# Patient Record
Sex: FEMALE | Race: WHITE | NOT HISPANIC OR LATINO | Employment: STUDENT | URBAN - METROPOLITAN AREA
[De-identification: names, ages, dates, MRNs, and addresses within clinical notes are randomized per-mention and may not be internally consistent; named-entity substitution may affect disease eponyms.]

---

## 2017-07-17 ENCOUNTER — ALLSCRIPTS OFFICE VISIT (OUTPATIENT)
Dept: OTHER | Facility: OTHER | Age: 62
End: 2017-07-17

## 2017-07-17 DIAGNOSIS — Z12.39 ENCOUNTER FOR OTHER SCREENING FOR MALIGNANT NEOPLASM OF BREAST: ICD-10-CM

## 2017-07-27 ENCOUNTER — GENERIC CONVERSION - ENCOUNTER (OUTPATIENT)
Dept: OTHER | Facility: OTHER | Age: 62
End: 2017-07-27

## 2018-01-14 VITALS
WEIGHT: 226.38 LBS | HEIGHT: 64 IN | DIASTOLIC BLOOD PRESSURE: 84 MMHG | HEART RATE: 78 BPM | SYSTOLIC BLOOD PRESSURE: 138 MMHG | OXYGEN SATURATION: 98 % | BODY MASS INDEX: 38.65 KG/M2

## 2018-08-12 PROBLEM — Z01.419 ENCOUNTER FOR ANNUAL ROUTINE GYNECOLOGICAL EXAMINATION: Status: ACTIVE | Noted: 2018-08-12

## 2018-08-12 PROBLEM — Z12.31 SCREENING MAMMOGRAM, ENCOUNTER FOR: Status: ACTIVE | Noted: 2018-08-12

## 2018-08-13 ENCOUNTER — ANNUAL EXAM (OUTPATIENT)
Dept: GYNECOLOGY | Facility: CLINIC | Age: 63
End: 2018-08-13
Payer: COMMERCIAL

## 2018-08-13 VITALS
HEART RATE: 66 BPM | HEIGHT: 64 IN | RESPIRATION RATE: 16 BRPM | SYSTOLIC BLOOD PRESSURE: 128 MMHG | WEIGHT: 227 LBS | BODY MASS INDEX: 38.76 KG/M2 | DIASTOLIC BLOOD PRESSURE: 76 MMHG

## 2018-08-13 DIAGNOSIS — Z12.31 SCREENING MAMMOGRAM, ENCOUNTER FOR: ICD-10-CM

## 2018-08-13 DIAGNOSIS — Z01.419 ENCOUNTER FOR ANNUAL ROUTINE GYNECOLOGICAL EXAMINATION: Primary | ICD-10-CM

## 2018-08-13 DIAGNOSIS — R39.15 URGENCY OF URINATION: ICD-10-CM

## 2018-08-13 PROCEDURE — S0612 ANNUAL GYNECOLOGICAL EXAMINA: HCPCS | Performed by: OBSTETRICS & GYNECOLOGY

## 2018-08-13 RX ORDER — OMEPRAZOLE 20 MG/1
CAPSULE, DELAYED RELEASE ORAL
COMMUNITY

## 2018-08-13 RX ORDER — NAPROXEN 500 MG/1
TABLET ORAL
COMMUNITY
Start: 2018-07-29

## 2018-08-13 RX ORDER — MAG HYDROX/ALUMINUM HYD/SIMETH 400-400-40
SUSPENSION, ORAL (FINAL DOSE FORM) ORAL
COMMUNITY
End: 2019-08-26 | Stop reason: ALTCHOICE

## 2018-08-13 NOTE — PROGRESS NOTES
Assessment/Plan:  Normal gynecologic exam  Obesity- recommended portion control, she eats to stay awake while driving  Gained 2 5  CoTesting '17 and '20  Return the office in 1 year  3-D mammography annually  Self breast exams monthly  Calcium 1,000 mg/daily- she supplements  Exercise- very active but doesn't specifically exercise- work and walking dogs  Colonoscopy 11/12- 1 polyp- due 11/17- was changed to 7-10 yrs  Diagnoses and all orders for this visit:    Encounter for annual routine gynecological examination    Screening mammogram, encounter for  -     Mammo screening bilateral w 3d & cad; Future    Urgency of urination    Other orders  -     aspirin (ASPIRIN LOW DOSE) 81 MG tablet; Take by mouth  -     Garlic 8413 MG CAPS; Take by mouth  -     Misc Natural Products (GLUCOS-CHONDROIT-MSM COMPLEX PO); Take by mouth  -     naproxen (NAPROSYN) 500 mg tablet;   -     omeprazole (PriLOSEC) 20 mg delayed release capsule; Take by mouth  -     Cholecalciferol (VITAMIN D3) 5000 units CAPS; Take by mouth  -     CALCIUM CITRATE PO; Take by mouth  -     Omega-3 Fatty Acids (OMEGA 3 PO); Take by mouth              Subjective:        Patient ID: Evie Mg is a 59 y o  female  [facility] is here for an annual visit  She is w/o complaints  She has been unable to lose weight  She in fact gained 2 5 lb  Still uses snacks to keep herself awake while driving  The following portions of the patient's history were reviewed and updated as appropriate: She  has a past medical history of Lactose intolerance    Patient Active Problem List    Diagnosis Date Noted    Urgency of urination 08/13/2018    Encounter for annual routine gynecological examination 08/12/2018    Screening mammogram, encounter for 08/12/2018   PMH:  G5, P3; SAVD x3, VIP, Comp Ab  LTL '92  Appendectomy '80  GERD   Menopause '99 44  AUB - EMB 12/06  Cervical Polypectomy '12  Lactose Intolerant '13  She  has a past surgical history that includes Tubal ligation; Appendectomy; and Cervical polypectomy  Her family history includes Cancer in her father and mother; Hypertension in her father; No Known Problems in her daughter, son, and son  FH:  F- Bradycardia 68, MI 80's, d Pneumonia 80, Face Melanoma [de-identified]  M- Alzheimer's d 80, Skin Ca 90  MA - MI  MU- DM   She is an only child  She  reports that she has never smoked  She has never used smokeless tobacco  She reports that she does not drink alcohol or use drugs  SH:  for [employer] , not in a relationship  Does animal rescue  6 Dogs, cats, and 3 Parrots  Candice Glass graduated [employer] '16  Son had a daughter 4/13/17     Current Outpatient Prescriptions   Medication Sig Dispense Refill    aspirin (ASPIRIN LOW DOSE) 81 MG tablet Take by mouth      CALCIUM CITRATE PO Take by mouth      Cholecalciferol (VITAMIN D3) 5000 units CAPS Take by mouth      Garlic 8436 MG CAPS Take by mouth      Misc Natural Products (GLUCOS-CHONDROIT-MSM COMPLEX PO) Take by mouth      naproxen (NAPROSYN) 500 mg tablet       Omega-3 Fatty Acids (OMEGA 3 PO) Take by mouth      omeprazole (PriLOSEC) 20 mg delayed release capsule Take by mouth       No current facility-administered medications for this visit  No current outpatient prescriptions on file prior to visit  No current facility-administered medications on file prior to visit  She is allergic to lactose       Review of Systems   Constitutional: Negative for activity change, appetite change, fatigue and unexpected weight change  Eyes: Negative for visual disturbance  Respiratory: Negative for cough, chest tightness, shortness of breath and wheezing  Cardiovascular: Negative for chest pain, palpitations and leg swelling  Breast: Patient denies tenderness, nipple discharge, masses, or erythema  Gastrointestinal: Negative for abdominal distention, abdominal pain, blood in stool, constipation, diarrhea, nausea and vomiting     Endocrine: Negative for cold intolerance and heat intolerance  Genitourinary: Positive for urgency (Occasional urge incontinence  This usually occurs at home were she is free to drink  While working she limits liquid intake to avoid this problem  )  Negative for decreased urine volume, difficulty urinating, dyspareunia, dysuria, frequency, hematuria, menstrual problem, pelvic pain, vaginal bleeding, vaginal discharge and vaginal pain  Musculoskeletal: Negative for arthralgias  Skin: Negative for rash  Neurological: Negative for weakness, light-headedness, numbness and headaches  Hematological: Does not bruise/bleed easily  Psychiatric/Behavioral: Negative for agitation, behavioral problems and sleep disturbance  The patient is not nervous/anxious  Objective:    Vitals:    08/13/18 1336   BP: 128/76   BP Location: Right arm   Patient Position: Sitting   Cuff Size: Standard   Pulse: 66   Resp: 16   Weight: 103 kg (227 lb)   Height: 5' 4" (1 626 m)            Physical Exam   Constitutional: She is oriented to person, place, and time  She appears well-developed and well-nourished  HENT:   Head: Normocephalic and atraumatic  Eyes: Conjunctivae and EOM are normal  Pupils are equal, round, and reactive to light  Neck: Normal range of motion  Neck supple  No tracheal deviation present  No thyromegaly present  Cardiovascular: Normal rate, regular rhythm and normal heart sounds  No murmur heard  Pulmonary/Chest: Effort normal and breath sounds normal  No respiratory distress  She has no wheezes  Right breast exhibits no inverted nipple, no mass, no nipple discharge, no skin change and no tenderness  Left breast exhibits no inverted nipple, no mass, no nipple discharge, no skin change and no tenderness  Breasts are symmetrical    Abdominal: Soft  Bowel sounds are normal  She exhibits no distension and no mass  There is no tenderness     Genitourinary: Uterus normal  Rectal exam shows no external hemorrhoid  No breast swelling, tenderness, discharge or bleeding  There is no rash, tenderness or lesion on the right labia  There is no rash, tenderness or lesion on the left labia  Uterus is not deviated, not enlarged and not tender  Cervix exhibits no motion tenderness and no discharge  Right adnexum displays no mass, no tenderness and no fullness  Left adnexum displays no mass, no tenderness and no fullness  Genitourinary Comments: Stable mild cystocele  Musculoskeletal: Normal range of motion  Neurological: She is alert and oriented to person, place, and time  Skin: Skin is warm and dry  Psychiatric: She has a normal mood and affect  Her behavior is normal  Judgment and thought content normal    Nursing note and vitals reviewed

## 2019-08-25 NOTE — PROGRESS NOTES
Assessment/Plan:  Normal gynecologic exam  Obesity- recommended portion control, she eats to stay awake while driving  Gained 2 5 '18, 7 '19  CoTesting '20  Return the office in 1 year  3-D mammography annually  Self breast exams monthly  Calcium 1,000 mg/daily- she supplements   Exercise- very active but doesn't specifically exercise- work and walking dogs  Colonoscopy 11/12- 1 polyp- due 11/17- was changed to 7-10 yrs  Diagnoses and all orders for this visit:    Encounter for annual routine gynecological examination    Encounter for screening mammogram for breast cancer  -     Mammo screening bilateral w 3d & cad; Future    BMI 40 0-44 9, adult ([facility] [address] )              Subjective:        Patient ID: Cuauhtemoc Meza is a 62 y o  female  Sandra Skaggs is here for her yearly visit  She is without complaints  She denies any bleeding  She is not sexually active  The following portions of the patient's history were reviewed and updated as appropriate: She  has a past medical history of Lactose intolerance  Patient Active Problem List    Diagnosis Date Noted    Urgency of urination 08/13/2018    Encounter for annual routine gynecological examination 08/12/2018    Screening mammogram, encounter for 08/12/2018   PMH:  G5, P3; SAVD x3, VIP, Comp Ab  LTL '92  Appendectomy '80  GERD   Menopause '99 44  AUB - EMB 12/06  Cervical Polypectomy '12      Lactose Intolerant '13      Morbid obesity '19  She  has a past surgical history that includes Tubal ligation; Appendectomy; and Cervical polypectomy  Her family history includes Cancer in her father and mother; Hypertension in her father; No Known Problems in her daughter, son, and son  FH:  F- Bradycardia 68, MI 80's, d Pneumonia 80, Face Melanoma [de-identified]  M- Alzheimer's d 80, Skin Ca 90  MA - MI  MU- DM   She  reports that she has never smoked  She has never used smokeless tobacco  She reports that she does not drink alcohol or use drugs     SH:  for [email or web address]  , not in a relationship  Does animal rescue  3 Dogs, cats, and 3 Parrots  Kar graduated [employer] '16  She is engaged and will be  in 6/21  She and her fiance live in [facility] [place] [facility]  Son had a daughter 4/13/17  and lives in [place]  Current Outpatient Medications   Medication Sig Dispense Refill    aspirin (ASPIRIN LOW DOSE) 81 MG tablet Take by mouth      CALCIUM CITRATE PO Take by mouth      Garlic 7768 MG CAPS Take by mouth      Misc Natural Products (GLUCOS-CHONDROIT-MSM COMPLEX PO) Take by mouth      naproxen (NAPROSYN) 500 mg tablet       Omega-3 Fatty Acids (OMEGA 3 PO) Take by mouth      omeprazole (PriLOSEC) 20 mg delayed release capsule Take by mouth       No current facility-administered medications for this visit  Current Outpatient Medications on File Prior to Visit   Medication Sig    aspirin (ASPIRIN LOW DOSE) 81 MG tablet Take by mouth    CALCIUM CITRATE PO Take by mouth    Garlic 7346 MG CAPS Take by mouth    Misc Natural Products (GLUCOS-CHONDROIT-MSM COMPLEX PO) Take by mouth    naproxen (NAPROSYN) 500 mg tablet     Omega-3 Fatty Acids (OMEGA 3 PO) Take by mouth    omeprazole (PriLOSEC) 20 mg delayed release capsule Take by mouth    [DISCONTINUED] Cholecalciferol (VITAMIN D3) 5000 units CAPS Take by mouth     No current facility-administered medications on file prior to visit  She is allergic to lactose       Review of Systems   Constitutional: Negative for activity change, appetite change, fatigue and unexpected weight change  Eyes: Negative for visual disturbance  Respiratory: Negative for cough, chest tightness, shortness of breath and wheezing  Cardiovascular: Negative for chest pain, palpitations and leg swelling  Breast: Patient denies tenderness, nipple discharge, masses, or erythema  Gastrointestinal: Negative for abdominal distention, abdominal pain, blood in stool, constipation, diarrhea, nausea and vomiting     Endocrine: Negative for cold intolerance and heat intolerance  Genitourinary: Positive for frequency (One weekends when she ingests more liquids) and urgency (Rare urge incontinence which is because she delays going to the bathroom  )  Negative for decreased urine volume, difficulty urinating, dyspareunia, dysuria, hematuria, menstrual problem, pelvic pain, vaginal bleeding, vaginal discharge and vaginal pain  Musculoskeletal: Negative for arthralgias  Skin: Negative for rash  Neurological: Negative for weakness, light-headedness, numbness and headaches  Hematological: Does not bruise/bleed easily  Psychiatric/Behavioral: Negative for agitation, behavioral problems and sleep disturbance  The patient is not nervous/anxious  Objective:    Vitals:    08/26/19 1317   BP: 148/82   BP Location: Left arm   Patient Position: Sitting   Cuff Size: Extra-Large   Pulse: 98   Weight: 106 kg (234 lb)   Height: 5' 4" (1 626 m)            Physical Exam   Constitutional: She is oriented to person, place, and time  She appears well-developed and well-nourished  HENT:   Head: Normocephalic and atraumatic  Eyes: Pupils are equal, round, and reactive to light  Conjunctivae and EOM are normal    Neck: Normal range of motion  Neck supple  No tracheal deviation present  No thyromegaly present  Cardiovascular: Normal rate, regular rhythm and normal heart sounds  No murmur heard  Pulmonary/Chest: Effort normal and breath sounds normal  No respiratory distress  She has no wheezes  Right breast exhibits no inverted nipple, no mass, no nipple discharge, no skin change and no tenderness  Left breast exhibits no inverted nipple, no mass, no nipple discharge, no skin change and no tenderness  No breast tenderness, discharge or bleeding  Breasts are symmetrical    Abdominal: Soft  Bowel sounds are normal  She exhibits no distension and no mass  There is no tenderness     Genitourinary: Vagina normal and uterus normal  Rectal exam shows no external hemorrhoid  No breast tenderness, discharge or bleeding  There is no rash, tenderness or lesion on the right labia  There is no rash, tenderness or lesion on the left labia  Uterus is not deviated, not enlarged and not tender  Cervix exhibits no motion tenderness and no discharge  Right adnexum displays no mass, no tenderness and no fullness  Left adnexum displays no mass, no tenderness and no fullness  Genitourinary Comments: Urethral meatus within normal limits  Perineum within normal limits  Bladder well supported  Musculoskeletal: Normal range of motion  Neurological: She is alert and oriented to person, place, and time  Skin: Skin is warm and dry  Psychiatric: She has a normal mood and affect  Her behavior is normal  Judgment and thought content normal    Nursing note and vitals reviewed

## 2019-08-26 ENCOUNTER — ANNUAL EXAM (OUTPATIENT)
Dept: GYNECOLOGY | Facility: CLINIC | Age: 64
End: 2019-08-26
Payer: COMMERCIAL

## 2019-08-26 VITALS
HEIGHT: 64 IN | DIASTOLIC BLOOD PRESSURE: 82 MMHG | HEART RATE: 98 BPM | BODY MASS INDEX: 39.95 KG/M2 | WEIGHT: 234 LBS | SYSTOLIC BLOOD PRESSURE: 148 MMHG

## 2019-08-26 DIAGNOSIS — Z01.419 ENCOUNTER FOR ANNUAL ROUTINE GYNECOLOGICAL EXAMINATION: Primary | ICD-10-CM

## 2019-08-26 DIAGNOSIS — Z12.31 ENCOUNTER FOR SCREENING MAMMOGRAM FOR BREAST CANCER: ICD-10-CM

## 2019-08-26 PROCEDURE — S0612 ANNUAL GYNECOLOGICAL EXAMINA: HCPCS | Performed by: OBSTETRICS & GYNECOLOGY

## 2020-08-31 ENCOUNTER — ANNUAL EXAM (OUTPATIENT)
Dept: GYNECOLOGY | Facility: CLINIC | Age: 65
End: 2020-08-31
Payer: COMMERCIAL

## 2020-08-31 VITALS
DIASTOLIC BLOOD PRESSURE: 84 MMHG | HEIGHT: 61 IN | WEIGHT: 236.4 LBS | BODY MASS INDEX: 44.63 KG/M2 | SYSTOLIC BLOOD PRESSURE: 138 MMHG

## 2020-08-31 DIAGNOSIS — Z01.419 ENCOUNTER FOR ANNUAL ROUTINE GYNECOLOGICAL EXAMINATION: Primary | ICD-10-CM

## 2020-08-31 DIAGNOSIS — Z12.4 SCREENING FOR CERVICAL CANCER: ICD-10-CM

## 2020-08-31 DIAGNOSIS — Z12.31 ENCOUNTER FOR SCREENING MAMMOGRAM FOR BREAST CANCER: ICD-10-CM

## 2020-08-31 PROCEDURE — S0612 ANNUAL GYNECOLOGICAL EXAMINA: HCPCS | Performed by: OBSTETRICS & GYNECOLOGY

## 2020-08-31 PROCEDURE — G0145 SCR C/V CYTO,THINLAYER,RESCR: HCPCS | Performed by: OBSTETRICS & GYNECOLOGY

## 2020-08-31 PROCEDURE — 87624 HPV HI-RISK TYP POOLED RSLT: CPT | Performed by: OBSTETRICS & GYNECOLOGY

## 2020-08-31 NOTE — PROGRESS NOTES
Assessment/Plan:  Normal gynecologic exam  Obesity- recommended portion control, she eats to stay awake while driving  Gained 2 5 '18, 7 '19, 2 '20  Small cervical polyp  CoTesting '20- none further  Return the office in 1 year  3-D mammography annually  Self breast exams monthly  Calcium 1,000 mg/daily- she supplements   Exercise- very active but doesn't specifically exercise- work and walking dogs  Colonoscopy 11/12- 1 polyp- due 11/17- was changed to 7-10 yrs  Diagnoses and all orders for this visit:    Encounter for annual routine gynecological examination  -     Liquid-based pap, screening    Screening for cervical cancer  -     Liquid-based pap, screening    Encounter for screening mammogram for breast cancer  -     Mammo screening bilateral w 3d & cad; Future              Subjective:        Patient ID: Cornelia Paul is a 60 y o  female  [employer] is here for her yearly evaluation  She is without any complaints  She denies any vaginal bleeding  She is due from a mammogram   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      The following portions of the patient's history were reviewed and updated as appropriate: She  has a past medical history of Lactose intolerance  Patient Active Problem List    Diagnosis Date Noted    Urgency of urination 08/13/2018    Encounter for annual routine gynecological examination 08/12/2018    Screening mammogram, encounter for 08/12/2018   PMH:  G5, P3; SAVD x3, VIP, Comp Ab  LTL '92  Appendectomy '80  GERD   Menopause '99 44  AUB - EMB 12/06  Cervical Polypectomy '12      Lactose Intolerant '13      Morbid obesity '19  She  has a past surgical history that includes Tubal ligation; Appendectomy; and Cervical polypectomy  Her family history includes Cancer in her father and mother; Hypertension in her father; No Known Problems in her daughter, son, and son     FH:  F- Bradycardia 67, MI 80's, d Pneumonia 80, Face Melanoma [de-identified]  M- Alzheimer's d 80, Skin Ca 90  [facility]      MU- DM   She  reports that she has never smoked  She has never used smokeless tobacco  She reports that she does not drink alcohol or use drugs  SH:  for [employer] , not in a relationship  Does animal rescue  3 Dogs, cats, and 2 Parrots  [employer] graduated [employer] '16  She is engaged and will be  in 6/21  She and her fiance live in [place]  Son had a daughter 4/13/17  and lives in [place]  Current Outpatient Medications   Medication Sig Dispense Refill    aspirin (ASPIRIN LOW DOSE) 81 MG tablet Take by mouth      CALCIUM CITRATE PO Take by mouth      Garlic 6056 MG CAPS Take by mouth      Misc Natural Products (GLUCOS-CHONDROIT-MSM COMPLEX PO) Take by mouth      naproxen (NAPROSYN) 500 mg tablet       Omega-3 Fatty Acids (OMEGA 3 PO) Take by mouth      omeprazole (PriLOSEC) 20 mg delayed release capsule Take by mouth       No current facility-administered medications for this visit  Current Outpatient Medications on File Prior to Visit   Medication Sig    aspirin (ASPIRIN LOW DOSE) 81 MG tablet Take by mouth    CALCIUM CITRATE PO Take by mouth    Garlic 7600 MG CAPS Take by mouth    Misc Natural Products (GLUCOS-CHONDROIT-MSM COMPLEX PO) Take by mouth    naproxen (NAPROSYN) 500 mg tablet     Omega-3 Fatty Acids (OMEGA 3 PO) Take by mouth    omeprazole (PriLOSEC) 20 mg delayed release capsule Take by mouth     No current facility-administered medications on file prior to visit  She is allergic to lactose       Review of Systems   Constitutional: Positive for unexpected weight change (Another 2 lb this year)  Negative for activity change, appetite change, chills, fatigue and fever  HENT: Negative for congestion, rhinorrhea, sinus pressure, sore throat and trouble swallowing  Eyes: Negative for discharge, redness, itching and visual disturbance     Respiratory: Negative for cough, chest tightness, shortness of breath and wheezing  Cardiovascular: Negative for chest pain, palpitations and leg swelling  Gastrointestinal: Negative for abdominal distention, abdominal pain, blood in stool, constipation, diarrhea, nausea and vomiting  Genitourinary: Positive for urgency (If voiding is delayed  Rare urge incontinence)  Negative for decreased urine volume, difficulty urinating, dyspareunia, dysuria, frequency, hematuria, menstrual problem, pelvic pain, vaginal bleeding, vaginal discharge and vaginal pain  Musculoskeletal: Negative for arthralgias  Skin: Negative for rash  Neurological: Negative for weakness, light-headedness, numbness and headaches  Hematological: Does not bruise/bleed easily  Psychiatric/Behavioral: Negative for agitation, behavioral problems and sleep disturbance  The patient is not nervous/anxious  Objective: There were no vitals filed for this visit  Physical Exam  Vitals signs and nursing note reviewed  Constitutional:       General: She is not in acute distress  Appearance: She is well-developed  She is obese  She is not ill-appearing  HENT:      Head: Normocephalic and atraumatic  Eyes:      Conjunctiva/sclera: Conjunctivae normal       Pupils: Pupils are equal, round, and reactive to light  Neck:      Musculoskeletal: Normal range of motion and neck supple  Thyroid: No thyromegaly  Trachea: No tracheal deviation  Cardiovascular:      Rate and Rhythm: Normal rate and regular rhythm  Heart sounds: Normal heart sounds  No murmur  Pulmonary:      Effort: Pulmonary effort is normal  No respiratory distress  Breath sounds: Normal breath sounds  No wheezing  Chest:      Breasts: Breasts are symmetrical          Right: No inverted nipple, mass, nipple discharge, skin change or tenderness  Left: No inverted nipple, mass, nipple discharge, skin change or tenderness  Abdominal:      General: Bowel sounds are normal  There is no distension  Palpations: Abdomen is soft  There is no mass  Tenderness: There is no abdominal tenderness  Genitourinary:     General: Normal vulva  Labia:         Right: No rash, tenderness or lesion  Left: No rash, tenderness or lesion  Vagina: Normal  No vaginal discharge  Cervix: No cervical motion tenderness or discharge  Uterus: Not deviated, not enlarged and not tender  Adnexa:         Right: No mass, tenderness or fullness  Left: No mass, tenderness or fullness  Rectum: No external hemorrhoid  Comments: Urethral meatus within normal limits  Perineum within normal limits  Bladder well supported  Small cervical polyp  Musculoskeletal: Normal range of motion  Skin:     General: Skin is warm and dry  Neurological:      Mental Status: She is alert and oriented to person, place, and time  Psychiatric:         Behavior: Behavior normal          Thought Content:  Thought content normal          Judgment: Judgment normal

## 2020-09-02 LAB
HPV HR 12 DNA CVX QL NAA+PROBE: NEGATIVE
HPV16 DNA CVX QL NAA+PROBE: NEGATIVE
HPV18 DNA CVX QL NAA+PROBE: NEGATIVE

## 2020-09-09 LAB
LAB AP GYN PRIMARY INTERPRETATION: NORMAL
Lab: NORMAL

## 2020-12-14 ENCOUNTER — HOSPITAL ENCOUNTER (OUTPATIENT)
Dept: RADIOLOGY | Facility: MEDICAL CENTER | Age: 65
Discharge: HOME | End: 2020-12-14
Payer: COMMERCIAL

## 2020-12-14 VITALS — BODY MASS INDEX: 44.56 KG/M2 | WEIGHT: 236 LBS | HEIGHT: 61 IN

## 2020-12-14 DIAGNOSIS — Z12.31 ENCOUNTER FOR SCREENING MAMMOGRAM FOR BREAST CANCER: ICD-10-CM

## 2020-12-14 PROCEDURE — 77067 SCR MAMMO BI INCL CAD: CPT

## 2020-12-14 PROCEDURE — 77063 BREAST TOMOSYNTHESIS BI: CPT

## 2021-02-09 DIAGNOSIS — Z23 ENCOUNTER FOR IMMUNIZATION: ICD-10-CM

## 2021-03-08 ENCOUNTER — IMMUNIZATIONS (OUTPATIENT)
Dept: FAMILY MEDICINE CLINIC | Facility: HOSPITAL | Age: 66
End: 2021-03-08

## 2021-03-08 DIAGNOSIS — Z23 ENCOUNTER FOR IMMUNIZATION: Primary | ICD-10-CM

## 2021-03-08 PROCEDURE — 91300 SARS-COV-2 / COVID-19 MRNA VACCINE (PFIZER-BIONTECH) 30 MCG: CPT

## 2021-03-08 PROCEDURE — 0001A SARS-COV-2 / COVID-19 MRNA VACCINE (PFIZER-BIONTECH) 30 MCG: CPT

## 2021-04-01 ENCOUNTER — IMMUNIZATIONS (OUTPATIENT)
Dept: FAMILY MEDICINE CLINIC | Facility: HOSPITAL | Age: 66
End: 2021-04-01

## 2021-04-01 DIAGNOSIS — Z23 ENCOUNTER FOR IMMUNIZATION: Primary | ICD-10-CM

## 2021-04-01 PROCEDURE — 0002A SARS-COV-2 / COVID-19 MRNA VACCINE (PFIZER-BIONTECH) 30 MCG: CPT

## 2021-04-01 PROCEDURE — 91300 SARS-COV-2 / COVID-19 MRNA VACCINE (PFIZER-BIONTECH) 30 MCG: CPT

## 2021-06-16 ENCOUNTER — TELEPHONE (OUTPATIENT)
Dept: GASTROENTEROLOGY | Facility: CLINIC | Age: 66
End: 2021-06-16

## 2021-06-16 NOTE — TELEPHONE ENCOUNTER
Patient called stating she received a call from us that she would be responsible for $300 for her colonoscopy scheduled June 21  She called her insurance and it is because she had a colonoscopy done 9 years ago and they will not cover until after 10 years   States she has to wait another year and cancelled her procedure for June 21 st

## 2021-08-19 ENCOUNTER — TELEPHONE (OUTPATIENT)
Dept: GASTROENTEROLOGY | Facility: CLINIC | Age: 66
End: 2021-08-19

## 2021-08-19 NOTE — TELEPHONE ENCOUNTER
Recall call went out via [place] in 2020 with no return calls from pt to schedule  Pt is due for a recall colon with Dr Viji Conner  Called and spoke to pt whom informed that she had it done elsewhere

## 2021-09-11 PROBLEM — N84.1 CERVICAL POLYP: Status: ACTIVE | Noted: 2021-09-11

## 2021-09-11 NOTE — PROGRESS NOTES
Assessment/Plan:  Normal gynecologic exam  Small cervical polyp 20  Small rectocele '21  Obesity- recommended portion control, she eats to stay awake while driving  Gained 4 0 '15, 7 '19, 2 '20  Lost 6 '21  BMD- now  CoTesting '20- none further  Return the office in 1 year  3-D mammography annually  Self breast exams monthly  Calcium 1,000 mg/daily- she supplements   Exercise- very active but doesn't specifically exercise- work and walking dogs  Colonoscopy 11/12- 1 polyp- due 11/17- was changed to 7-10 yrs  Diagnoses and all orders for this visit:    Encounter for annual routine gynecological examination    Screening mammogram, encounter for  -     Mammo screening bilateral w 3d & cad; Future    Cervical polyp    Encounter for osteoporosis screening in asymptomatic postmenopausal patient  -     DXA bone density spine hip and pelvis; Future    Urgency of urination    Urge incontinence    Rectocele    Other orders  -     hydrochlorothiazide (HYDRODIURIL) 25 mg tablet  -     metoprolol succinate (TOPROL-XL) 50 mg 24 hr tablet              Subjective:        Patient ID: Abdi Yip is a 73 y o  female  Dotty is here for a yearly evaluation  She is without complaints  She attempted to have a colonoscopy earlier in the year was told she had a $300 co-pay  Procedure was canceled and she try to reschedule but the office never called her back  She has an appointment today to discuss scheduling a colonoscopy  She was diagnosed with hypertension in October  An echocardiogram was performed and then a CT scan which revealed and an Aneurysm  The following portions of the patient's history were reviewed and updated as appropriate: She  has a past medical history of Lactose intolerance    Patient Active Problem List    Diagnosis Date Noted    Cervical polyp 09/11/2021    Urgency of urination 08/13/2018    Encounter for annual routine gynecological examination 08/12/2018    Screening mammogram, encounter for 08/12/2018   PMH:  G5, P3; SAVD x3, VIP, Comp Ab  LTL '92  Appendectomy '80  GERD   Menopause '99 44  AUB - EMB 12/06  Cervical Polypectomy '12      Lactose Intolerant '13      Morbid obesity '19  She  has a past surgical history that includes Tubal ligation; Appendectomy; and Cervical polypectomy  Her family history includes Cancer in her father and mother; Cancer (age of onset: [de-identified]) in her maternal aunt; Hypertension in her father; No Known Problems in her daughter, son, and son  FH:  F- Bradycardia 67, MI 80's, d Pneumonia 80, Face Melanoma [de-identified]  M- Alzheimer's d 80, Skin Ca 90  MA - MI      MU- DM   She  reports that she has never smoked  She has never used smokeless tobacco  She reports that she does not drink alcohol and does not use drugs  SH:  for [employer] , not in a relationship  Does animal rescue  3 Dogs, cats, and 2 Parrots  [employer] graduated [employer] '16  Wedding/Engagement was broken off by ejnna  Son had a daughter 4/13/17  and lives in [facility]  Current Outpatient Medications   Medication Sig Dispense Refill    aspirin (ASPIRIN LOW DOSE) 81 MG tablet Take by mouth      CALCIUM CITRATE PO Take by mouth      Garlic 4317 MG CAPS Take by mouth      hydrochlorothiazide (HYDRODIURIL) 25 mg tablet       metoprolol succinate (TOPROL-XL) 50 mg 24 hr tablet       Misc Natural Products (GLUCOS-CHONDROIT-MSM COMPLEX PO) Take by mouth      naproxen (NAPROSYN) 500 mg tablet       Omega-3 Fatty Acids (OMEGA 3 PO) Take by mouth      omeprazole (PriLOSEC) 20 mg delayed release capsule Take by mouth       No current facility-administered medications for this visit       Current Outpatient Medications on File Prior to Visit   Medication Sig    aspirin (ASPIRIN LOW DOSE) 81 MG tablet Take by mouth    CALCIUM CITRATE PO Take by mouth    Garlic 2249 MG CAPS Take by mouth    hydrochlorothiazide (HYDRODIURIL) 25 mg tablet     metoprolol succinate (TOPROL-XL) 50 mg 24 hr tablet     Misc Natural Products (GLUCOS-CHONDROIT-MSM COMPLEX PO) Take by mouth    naproxen (NAPROSYN) 500 mg tablet     Omega-3 Fatty Acids (OMEGA 3 PO) Take by mouth    omeprazole (PriLOSEC) 20 mg delayed release capsule Take by mouth     No current facility-administered medications on file prior to visit  She is allergic to lactose - food allergy       Review of Systems   Constitutional: Negative for activity change, appetite change, fatigue and unexpected weight change  Eyes: Negative for visual disturbance  Respiratory: Negative for cough, chest tightness, shortness of breath and wheezing  Cardiovascular: Negative for chest pain, palpitations and leg swelling  Breast: Patient denies tenderness, nipple discharge, masses, or erythema  Gastrointestinal: Negative for abdominal distention, abdominal pain, blood in stool, constipation, diarrhea, nausea and vomiting  Endocrine: Negative for cold intolerance and heat intolerance  Genitourinary: Positive for urgency  Negative for decreased urine volume, difficulty urinating, dysuria, frequency, hematuria, menstrual problem, pelvic pain, vaginal bleeding, vaginal discharge and vaginal pain  She is not sexually active  She does note urgency with urge incontinence if she waits too long  She also has rectal urgency at times  Musculoskeletal: Negative for arthralgias  Skin: Negative for rash  Neurological: Negative for weakness, light-headedness, numbness and headaches  Hematological: Does not bruise/bleed easily  Psychiatric/Behavioral: Negative for agitation, behavioral problems and sleep disturbance  The patient is not nervous/anxious  Objective:    Vitals:    09/13/21 1115   BP: 130/70   Weight: 104 kg (230 lb)   Height: 5' 1" (1 549 m)            Physical Exam  Vitals and nursing note reviewed  Constitutional:       General: She is not in acute distress  Appearance: She is well-developed     HENT:      Head: Normocephalic and atraumatic  Eyes:      General: No scleral icterus  Right eye: No discharge  Left eye: No discharge  Extraocular Movements: Extraocular movements intact  Conjunctiva/sclera: Conjunctivae normal    Neck:      Thyroid: No thyromegaly  Trachea: No tracheal deviation  Cardiovascular:      Rate and Rhythm: Normal rate and regular rhythm  Heart sounds: Normal heart sounds  No murmur heard  Pulmonary:      Effort: Pulmonary effort is normal  No respiratory distress  Breath sounds: Normal breath sounds  No wheezing  Chest:      Breasts: Breasts are symmetrical          Right: No inverted nipple, mass, nipple discharge, skin change or tenderness  Left: No inverted nipple, mass, nipple discharge, skin change or tenderness  Abdominal:      General: Bowel sounds are normal  There is no distension  Palpations: Abdomen is soft  There is no mass  Tenderness: There is no abdominal tenderness  There is no right CVA tenderness, left CVA tenderness, guarding or rebound  Genitourinary:     General: Normal vulva  Labia:         Right: No rash, tenderness or lesion  Left: No rash, tenderness or lesion  Vagina: Normal       Cervix: No cervical motion tenderness or discharge  Uterus: Not deviated, not enlarged and not tender  Adnexa:         Right: No mass, tenderness or fullness  Left: No mass, tenderness or fullness  Rectum: No external hemorrhoid  Comments: Urethral meatus within normal limits  Perineum within normal limits  Bladder well supported  There is a mild rectocele present and physiologic vaginal atrophy  There is a stable cervical polyp  Musculoskeletal:         General: No tenderness  Normal range of motion  Cervical back: Normal range of motion and neck supple  Lymphadenopathy:      Cervical: No cervical adenopathy  Skin:     General: Skin is warm and dry     Neurological:      Mental Status: She is alert and oriented to person, place, and time  Psychiatric:         Mood and Affect: Mood normal          Behavior: Behavior normal          Thought Content:  Thought content normal          Judgment: Judgment normal

## 2021-09-13 ENCOUNTER — PREP FOR PROCEDURE (OUTPATIENT)
Dept: SURGERY | Facility: CLINIC | Age: 66
End: 2021-09-13

## 2021-09-13 ENCOUNTER — ANNUAL EXAM (OUTPATIENT)
Dept: OBGYN CLINIC | Facility: CLINIC | Age: 66
End: 2021-09-13
Payer: COMMERCIAL

## 2021-09-13 ENCOUNTER — CONSULT (OUTPATIENT)
Dept: SURGERY | Facility: CLINIC | Age: 66
End: 2021-09-13
Payer: COMMERCIAL

## 2021-09-13 VITALS
BODY MASS INDEX: 43.43 KG/M2 | WEIGHT: 230 LBS | DIASTOLIC BLOOD PRESSURE: 70 MMHG | HEIGHT: 61 IN | SYSTOLIC BLOOD PRESSURE: 130 MMHG

## 2021-09-13 VITALS — BODY MASS INDEX: 39.27 KG/M2 | WEIGHT: 230 LBS | HEIGHT: 64 IN

## 2021-09-13 DIAGNOSIS — Z86.010 HISTORY OF COLON POLYPS: Primary | ICD-10-CM

## 2021-09-13 DIAGNOSIS — N81.6 RECTOCELE: ICD-10-CM

## 2021-09-13 DIAGNOSIS — R39.15 URGENCY OF URINATION: ICD-10-CM

## 2021-09-13 DIAGNOSIS — Z78.0 ENCOUNTER FOR OSTEOPOROSIS SCREENING IN ASYMPTOMATIC POSTMENOPAUSAL PATIENT: ICD-10-CM

## 2021-09-13 DIAGNOSIS — Z13.820 ENCOUNTER FOR OSTEOPOROSIS SCREENING IN ASYMPTOMATIC POSTMENOPAUSAL PATIENT: ICD-10-CM

## 2021-09-13 DIAGNOSIS — N84.1 CERVICAL POLYP: ICD-10-CM

## 2021-09-13 DIAGNOSIS — N39.41 URGE INCONTINENCE: ICD-10-CM

## 2021-09-13 DIAGNOSIS — Z01.419 ENCOUNTER FOR ANNUAL ROUTINE GYNECOLOGICAL EXAMINATION: Primary | ICD-10-CM

## 2021-09-13 DIAGNOSIS — Z12.11 ENCOUNTER FOR SCREENING COLONOSCOPY: Primary | ICD-10-CM

## 2021-09-13 DIAGNOSIS — Z12.31 SCREENING MAMMOGRAM, ENCOUNTER FOR: ICD-10-CM

## 2021-09-13 PROCEDURE — 99202 OFFICE O/P NEW SF 15 MIN: CPT | Performed by: SURGERY

## 2021-09-13 PROCEDURE — G0101 CA SCREEN;PELVIC/BREAST EXAM: HCPCS | Performed by: OBSTETRICS & GYNECOLOGY

## 2021-09-13 RX ORDER — HYDROCHLOROTHIAZIDE 25 MG/1
TABLET ORAL
COMMUNITY
Start: 2021-08-13

## 2021-09-13 RX ORDER — METOPROLOL SUCCINATE 50 MG/1
TABLET, EXTENDED RELEASE ORAL
COMMUNITY
Start: 2021-08-31

## 2021-09-13 NOTE — PROGRESS NOTES
Assessment/Plan:  Patient has a history of colon polyps  She desires updated colonoscopy  No family history for colon cancer  No symptoms of change in her GI status  Colonoscopy recommended  Risks and benefits described in detail  All questions answered  Patient's chart and imaging studies reviewed  Diagnoses and all orders for this visit:    Encounter for screening colonoscopy        Subjective:      Patient ID: Sylvia Dixon is a 73 y o  female  Patient presents for pre colonoscopy consult  Denies problems  States last colonoscopy was 9 years ago and she had polyps  The following portions of the patient's history were reviewed and updated as appropriate:     She  has a past medical history of Colon polyp (2012), Hypertension (2021), and Lactose intolerance  She  has a past surgical history that includes Tubal ligation; Appendectomy; Cervical polypectomy; and Colonoscopy (2012)  Her family history includes Cancer in her father and mother; Cancer (age of onset: [de-identified]) in her maternal aunt; Hypertension in her father; No Known Problems in her daughter, son, and son  She  reports that she has never smoked  She has never used smokeless tobacco  She reports that she does not drink alcohol and does not use drugs  Current Outpatient Medications   Medication Sig Dispense Refill    aspirin (ASPIRIN LOW DOSE) 81 MG tablet Take by mouth      CALCIUM CITRATE PO Take by mouth      Garlic 5067 MG CAPS Take by mouth      hydrochlorothiazide (HYDRODIURIL) 25 mg tablet       metoprolol succinate (TOPROL-XL) 50 mg 24 hr tablet       Misc Natural Products (GLUCOS-CHONDROIT-MSM COMPLEX PO) Take by mouth      naproxen (NAPROSYN) 500 mg tablet       Omega-3 Fatty Acids (OMEGA 3 PO) Take by mouth      omeprazole (PriLOSEC) 20 mg delayed release capsule Take by mouth       No current facility-administered medications for this visit  She is allergic to lactose - food allergy       Review of Systems Constitutional: Negative  Negative for activity change  HENT: Negative  Eyes: Negative  Respiratory: Negative  Cardiovascular: Negative  Gastrointestinal: Negative  Endocrine: Negative  Genitourinary: Negative  Musculoskeletal: Negative  Skin: Negative  Allergic/Immunologic: Positive for food allergies  Neurological: Negative  Psychiatric/Behavioral: Negative for agitation, behavioral problems and confusion  The patient is not nervous/anxious  All other systems reviewed and are negative  Objective:      Ht 5' 4" (1 626 m)   Wt 104 kg (230 lb)   LMP  (LMP Unknown)   BMI 39 48 kg/m²          Physical Exam  Constitutional:       Appearance: Normal appearance  She is well-developed  HENT:      Head: Normocephalic and atraumatic  Nose: Nose normal    Eyes:      Extraocular Movements: Extraocular movements intact  Conjunctiva/sclera: Conjunctivae normal    Cardiovascular:      Rate and Rhythm: Normal rate and regular rhythm  Heart sounds: Normal heart sounds  Pulmonary:      Effort: Pulmonary effort is normal       Breath sounds: Normal breath sounds  Abdominal:      General: Abdomen is flat  Musculoskeletal:         General: Normal range of motion  Cervical back: Normal range of motion  Skin:     General: Skin is warm and dry  Neurological:      Mental Status: She is alert and oriented to person, place, and time     Psychiatric:         Mood and Affect: Mood normal

## 2021-10-19 ENCOUNTER — TELEPHONE (OUTPATIENT)
Dept: OTHER | Facility: OTHER | Age: 66
End: 2021-10-19

## 2021-10-22 ENCOUNTER — TELEPHONE (OUTPATIENT)
Dept: GASTROENTEROLOGY | Facility: HOSPITAL | Age: 66
End: 2021-10-22

## 2021-10-24 ENCOUNTER — ANESTHESIA (OUTPATIENT)
Dept: ANESTHESIOLOGY | Facility: HOSPITAL | Age: 66
End: 2021-10-24

## 2021-10-24 ENCOUNTER — ANESTHESIA EVENT (OUTPATIENT)
Dept: ANESTHESIOLOGY | Facility: HOSPITAL | Age: 66
End: 2021-10-24

## 2021-10-25 ENCOUNTER — ANESTHESIA EVENT (OUTPATIENT)
Dept: GASTROENTEROLOGY | Facility: HOSPITAL | Age: 66
End: 2021-10-25

## 2021-10-25 ENCOUNTER — ANESTHESIA (OUTPATIENT)
Dept: GASTROENTEROLOGY | Facility: HOSPITAL | Age: 66
End: 2021-10-25

## 2021-10-25 ENCOUNTER — HOSPITAL ENCOUNTER (OUTPATIENT)
Dept: GASTROENTEROLOGY | Facility: HOSPITAL | Age: 66
Setting detail: OUTPATIENT SURGERY
Discharge: HOME | End: 2021-10-25
Attending: SURGERY
Payer: COMMERCIAL

## 2021-10-25 VITALS
HEIGHT: 64 IN | TEMPERATURE: 97.1 F | OXYGEN SATURATION: 100 % | RESPIRATION RATE: 18 BRPM | BODY MASS INDEX: 39.27 KG/M2 | HEART RATE: 65 BPM | WEIGHT: 230 LBS | SYSTOLIC BLOOD PRESSURE: 120 MMHG | DIASTOLIC BLOOD PRESSURE: 49 MMHG

## 2021-10-25 DIAGNOSIS — Z86.010 HISTORY OF COLON POLYPS: ICD-10-CM

## 2021-10-25 PROBLEM — I10 HYPERTENSION: Status: ACTIVE | Noted: 2021-01-01

## 2021-10-25 PROCEDURE — G0121 COLON CA SCRN NOT HI RSK IND: HCPCS | Performed by: SURGERY

## 2021-10-25 RX ORDER — PROPOFOL 10 MG/ML
INJECTION, EMULSION INTRAVENOUS AS NEEDED
Status: DISCONTINUED | OUTPATIENT
Start: 2021-10-25 | End: 2021-10-25

## 2021-10-25 RX ORDER — SODIUM CHLORIDE 9 MG/ML
INJECTION, SOLUTION INTRAVENOUS CONTINUOUS PRN
Status: DISCONTINUED | OUTPATIENT
Start: 2021-10-25 | End: 2021-10-25

## 2021-10-25 RX ADMIN — SODIUM CHLORIDE: 9 INJECTION, SOLUTION INTRAVENOUS at 07:45

## 2021-10-25 RX ADMIN — PROPOFOL 100 MG: 10 INJECTION, EMULSION INTRAVENOUS at 08:02

## 2021-10-25 RX ADMIN — PROPOFOL 30 MG: 10 INJECTION, EMULSION INTRAVENOUS at 08:10

## 2021-10-25 RX ADMIN — PROPOFOL 30 MG: 10 INJECTION, EMULSION INTRAVENOUS at 08:15

## 2021-10-25 RX ADMIN — PROPOFOL 20 MG: 10 INJECTION, EMULSION INTRAVENOUS at 08:20

## 2021-10-25 RX ADMIN — PROPOFOL 20 MG: 10 INJECTION, EMULSION INTRAVENOUS at 08:06

## 2021-12-13 ENCOUNTER — HOSPITAL ENCOUNTER (OUTPATIENT)
Dept: RADIOLOGY | Facility: MEDICAL CENTER | Age: 66
Discharge: HOME | End: 2021-12-13
Payer: COMMERCIAL

## 2021-12-13 DIAGNOSIS — I71.2 THORACIC AORTIC ANEURYSM, WITHOUT RUPTURE (HCC): ICD-10-CM

## 2021-12-13 PROCEDURE — 71250 CT THORAX DX C-: CPT

## 2022-01-15 ENCOUNTER — IMMUNIZATIONS (OUTPATIENT)
Dept: FAMILY MEDICINE CLINIC | Facility: HOSPITAL | Age: 67
End: 2022-01-15

## 2022-01-15 DIAGNOSIS — Z23 ENCOUNTER FOR IMMUNIZATION: Primary | ICD-10-CM

## 2022-01-15 PROCEDURE — 91300 COVID-19 PFIZER VACC 0.3 ML: CPT

## 2022-01-15 PROCEDURE — 0001A COVID-19 PFIZER VACC 0.3 ML: CPT

## 2022-01-31 ENCOUNTER — HOSPITAL ENCOUNTER (OUTPATIENT)
Dept: RADIOLOGY | Facility: MEDICAL CENTER | Age: 67
Discharge: HOME | End: 2022-01-31
Payer: COMMERCIAL

## 2022-01-31 VITALS — WEIGHT: 230 LBS | BODY MASS INDEX: 39.27 KG/M2 | HEIGHT: 64 IN

## 2022-01-31 DIAGNOSIS — Z13.820 ENCOUNTER FOR OSTEOPOROSIS SCREENING IN ASYMPTOMATIC POSTMENOPAUSAL PATIENT: ICD-10-CM

## 2022-01-31 DIAGNOSIS — Z12.31 SCREENING MAMMOGRAM, ENCOUNTER FOR: ICD-10-CM

## 2022-01-31 DIAGNOSIS — Z78.0 ENCOUNTER FOR OSTEOPOROSIS SCREENING IN ASYMPTOMATIC POSTMENOPAUSAL PATIENT: ICD-10-CM

## 2022-01-31 PROCEDURE — 77067 SCR MAMMO BI INCL CAD: CPT

## 2022-01-31 PROCEDURE — 77080 DXA BONE DENSITY AXIAL: CPT

## 2022-01-31 PROCEDURE — 77063 BREAST TOMOSYNTHESIS BI: CPT

## 2022-09-18 PROBLEM — N81.6 RECTOCELE: Status: ACTIVE | Noted: 2022-09-18

## 2022-09-18 PROBLEM — E66.9 OBESITY (BMI 35.0-39.9 WITHOUT COMORBIDITY): Status: ACTIVE | Noted: 2022-09-18

## 2022-09-18 PROBLEM — N39.41 URGE INCONTINENCE: Status: ACTIVE | Noted: 2022-09-18

## 2022-09-18 PROBLEM — Z12.31 SCREENING MAMMOGRAM, ENCOUNTER FOR: Status: ACTIVE | Noted: 2022-09-18

## 2022-09-19 ENCOUNTER — ANNUAL EXAM (OUTPATIENT)
Dept: OBGYN CLINIC | Facility: CLINIC | Age: 67
End: 2022-09-19
Payer: COMMERCIAL

## 2022-09-19 VITALS
SYSTOLIC BLOOD PRESSURE: 120 MMHG | DIASTOLIC BLOOD PRESSURE: 70 MMHG | WEIGHT: 248 LBS | HEIGHT: 63 IN | BODY MASS INDEX: 43.94 KG/M2

## 2022-09-19 DIAGNOSIS — R39.15 URGENCY OF URINATION: ICD-10-CM

## 2022-09-19 DIAGNOSIS — N39.41 URGE INCONTINENCE: ICD-10-CM

## 2022-09-19 DIAGNOSIS — N84.1 CERVICAL POLYP: Primary | ICD-10-CM

## 2022-09-19 DIAGNOSIS — Z12.31 SCREENING MAMMOGRAM, ENCOUNTER FOR: ICD-10-CM

## 2022-09-19 DIAGNOSIS — N81.6 RECTOCELE: ICD-10-CM

## 2022-09-19 PROCEDURE — 99214 OFFICE O/P EST MOD 30 MIN: CPT | Performed by: OBSTETRICS & GYNECOLOGY

## 2022-09-19 RX ORDER — FUROSEMIDE 20 MG/1
TABLET ORAL
COMMUNITY
Start: 2022-07-18

## 2022-09-19 RX ORDER — TRIAMCINOLONE ACETONIDE 1 MG/G
CREAM TOPICAL
COMMUNITY
Start: 2022-07-20

## 2022-09-19 RX ORDER — METOPROLOL SUCCINATE 25 MG/1
TABLET, EXTENDED RELEASE ORAL
COMMUNITY
Start: 2022-07-20

## 2022-09-19 NOTE — PROGRESS NOTES
Assessment/Plan:  Small cervical polyp '22 - stable  Small rectocele '21  - stable  Obesity- recommended portion control, she eats to stay awake while driving  Gained 23 since  '18   BMD- NL 1/22 , repeat 1/28 [facility] 0 4/12 %]  Urgency with urge incontinence - stable  CoTesting '20- none further  Return the office in 1 year  3-D mammography annually  Self breast exams monthly  Calcium 1,000 mg/daily- she supplements   Exercise- very active but doesn't specifically exercise- work and walking dogs  Colonoscopy 11/12- 1 polyp, repeated 10/21 - 10 yrs now       Diagnoses and all orders for this visit:    Cervical polyp    Rectocele    Urgency of urination    Urge incontinence    Screening mammogram, encounter for  -     Mammo screening bilateral w 3d & cad; Future    BMI 40 0-44 9, adult ([facility] [address] )    Other orders  -     metoprolol succinate (TOPROL-XL) 25 mg 24 hr tablet  -     triamcinolone (KENALOG) 0 1 % cream  -     furosemide (LASIX) 20 mg tablet              Subjective:        Patient ID: Ulysses Potash is a 78 y o  female  Zaki Pace returns for a follow-up visit  A cervical polyp was present last year  It was small and observed  She denies any vaginal bleeding  She is not sexually active  To screen for osteoporosis she underwent a bone density study  It was normal   She continues to walk her animals and consumes calcium and takes supplements  Unfortunately she continues to gain weight-18 this past year  She eats at work to keep herself awake  Portion control has been explained in the past but not embraced  She denies any pelvic pressure or bulging  She cannot see the rectocele  She is compliant with mammography and self-breast awareness  The urgency and urge incontinence continue  They are worse in the morning following the diuretic  She is aware this would improve if she lost weight      She is compliant with mammography and performs self-breast awareness to screen for breast cancer      The following portions of the patient's history were reviewed and updated as appropriate: She  has a past medical history of Colon polyp (2012), Hypertension (2021), and Lactose intolerance  Patient Active Problem List    Diagnosis Date Noted    Rectocele 09/18/2022    Urge incontinence 09/18/2022    Screening mammogram, encounter for 09/18/2022    Obesity (BMI 35 0-39 9 without comorbidity) 09/18/2022    Cervical polyp 09/11/2021    Hypertension 2021    Urgency of urination 08/13/2018    Encounter for annual routine gynecological examination 08/12/2018    Encounter for screening colonoscopy 08/12/2018    Colon polyp 2012   PMH:  G5, P3; SAVD x3, VIP, Comp Ab  LTL '92  Appendectomy '80  GERD   Menopause '99 44  AUB - EMB 12/06  Cervical Polypectomy '12      Lactose Intolerant '13      Morbid obesity '19      HTN '21      Eczema 6/22  She  has a past surgical history that includes Tubal ligation; Appendectomy; Cervical polypectomy; and Colonoscopy (2012)  Her family history includes Cancer in her father and mother; Cancer (age of onset: [de-identified]) in her maternal aunt; Hypertension in her father; No Known Problems in her daughter, maternal grandfather, maternal grandmother, paternal grandfather, paternal grandmother, son, and son  FH:  F- Bradycardia 67, MI 80's, d Pneumonia 80, Face Melanoma [de-identified]  M- Alzheimer's d 80, Skin Ca 80  MA - MI      MU- DM   She  reports that she has never smoked  She has never used smokeless tobacco  She reports that she does not drink alcohol and does not use drugs  SH:    for [employer] , not in a relationship  Does animal rescue  3 Dogs, cats, and 2 Parrots  Kar graduated [employer] '16  Wedding/Engagement was broken off by jenna  Son had a daughter 4/13/17  and lives in [place]    Current Outpatient Medications   Medication Sig Dispense Refill    aspirin 81 MG tablet Take by mouth      CALCIUM CITRATE PO Take by mouth      furosemide (LASIX) 20 mg tablet       Garlic 1000 MG CAPS Take by mouth      metoprolol succinate (TOPROL-XL) 25 mg 24 hr tablet       metoprolol succinate (TOPROL-XL) 50 mg 24 hr tablet       Misc Natural Products (GLUCOS-CHONDROIT-MSM COMPLEX PO) Take by mouth      naproxen (NAPROSYN) 500 mg tablet       Omega-3 Fatty Acids (OMEGA 3 PO) Take by mouth      omeprazole (PriLOSEC) 20 mg delayed release capsule Take by mouth      triamcinolone (KENALOG) 0 1 % cream       hydrochlorothiazide (HYDRODIURIL) 25 mg tablet  (Patient not taking: No sig reported)       No current facility-administered medications for this visit  Current Outpatient Medications on File Prior to Visit   Medication Sig    aspirin 81 MG tablet Take by mouth    CALCIUM CITRATE PO Take by mouth    furosemide (LASIX) 20 mg tablet     Garlic 8345 MG CAPS Take by mouth    metoprolol succinate (TOPROL-XL) 25 mg 24 hr tablet     metoprolol succinate (TOPROL-XL) 50 mg 24 hr tablet     Misc Natural Products (GLUCOS-CHONDROIT-MSM COMPLEX PO) Take by mouth    naproxen (NAPROSYN) 500 mg tablet     Omega-3 Fatty Acids (OMEGA 3 PO) Take by mouth    omeprazole (PriLOSEC) 20 mg delayed release capsule Take by mouth    triamcinolone (KENALOG) 0 1 % cream     hydrochlorothiazide (HYDRODIURIL) 25 mg tablet  (Patient not taking: No sig reported)     No current facility-administered medications on file prior to visit  She is allergic to lactose - food allergy       Review of Systems   Constitutional: Negative for activity change, appetite change, fatigue and unexpected weight change  Eyes: Negative for visual disturbance  Respiratory: Negative for cough, chest tightness, shortness of breath and wheezing  Cardiovascular: Negative for chest pain, palpitations and leg swelling  Breast: Patient denies tenderness, nipple discharge, masses, or erythema     Gastrointestinal: Negative for abdominal distention, abdominal pain, blood in stool, constipation, diarrhea, nausea and vomiting  Endocrine: Negative for cold intolerance and heat intolerance  Genitourinary: Positive for urgency  Negative for decreased urine volume, difficulty urinating, dysuria, frequency, hematuria, menstrual problem, pelvic pain, vaginal bleeding, vaginal discharge and vaginal pain  Stable urgency and urge incontinence  Musculoskeletal: Negative for arthralgias  Skin: Negative for rash  Neurological: Negative for weakness, light-headedness, numbness and headaches  Hematological: Does not bruise/bleed easily  Psychiatric/Behavioral: Negative for agitation, behavioral problems and sleep disturbance  The patient is not nervous/anxious  Objective:    Vitals:    09/19/22 1144   BP: 120/70   BP Location: Right arm   Patient Position: Sitting   Cuff Size: Standard   Weight: 112 kg (248 lb)   Height: 5' 3" (1 6 m)            Physical Exam  Vitals and nursing note reviewed  Constitutional:       General: She is not in acute distress  Appearance: She is well-developed  HENT:      Head: Normocephalic and atraumatic  Eyes:      General: No scleral icterus  Right eye: No discharge  Left eye: No discharge  Extraocular Movements: Extraocular movements intact  Conjunctiva/sclera: Conjunctivae normal    Neck:      Thyroid: No thyromegaly  Trachea: No tracheal deviation  Cardiovascular:      Rate and Rhythm: Normal rate and regular rhythm  Heart sounds: Normal heart sounds  No murmur heard  Pulmonary:      Effort: Pulmonary effort is normal  No respiratory distress  Breath sounds: Normal breath sounds  No wheezing  Chest:   Breasts: Breasts are symmetrical       Right: No inverted nipple, mass, nipple discharge, skin change or tenderness  Left: No inverted nipple, mass, nipple discharge, skin change or tenderness  Abdominal:      General: Bowel sounds are normal  There is no distension  Palpations: Abdomen is soft  There is no mass  Tenderness: There is no abdominal tenderness  There is no guarding or rebound  Genitourinary:     General: Normal vulva  Labia:         Right: No rash, tenderness or lesion  Left: No rash, tenderness or lesion  Vagina: Normal       Cervix: No cervical motion tenderness or discharge  Uterus: Not deviated, not enlarged and not tender  Adnexa:         Right: No mass, tenderness or fullness  Left: No mass, tenderness or fullness  Rectum: No external hemorrhoid  Comments: Urethral meatus within normal limits  Perineum within normal limits  Bladder well supported  There is a stable rectocele  A small cervical polyp is present within the ectocervical os  Musculoskeletal:         General: No tenderness  Normal range of motion  Cervical back: Normal range of motion and neck supple  Lymphadenopathy:      Cervical: No cervical adenopathy  Skin:     General: Skin is warm and dry  Neurological:      Mental Status: She is alert and oriented to person, place, and time  Psychiatric:         Mood and Affect: Mood normal          Behavior: Behavior normal          Thought Content:  Thought content normal          Judgment: Judgment normal

## 2022-12-19 ENCOUNTER — HOSPITAL ENCOUNTER (OUTPATIENT)
Dept: RADIOLOGY | Facility: MEDICAL CENTER | Age: 67
Discharge: HOME | End: 2022-12-19

## 2022-12-19 DIAGNOSIS — R91.1 COIN LESION: ICD-10-CM

## 2023-02-14 ENCOUNTER — HOSPITAL ENCOUNTER (OUTPATIENT)
Dept: RADIOLOGY | Facility: MEDICAL CENTER | Age: 68
Discharge: HOME | End: 2023-02-14

## 2023-02-14 VITALS — WEIGHT: 248 LBS | BODY MASS INDEX: 43.94 KG/M2 | HEIGHT: 63 IN

## 2023-02-14 DIAGNOSIS — Z12.31 SCREENING MAMMOGRAM, ENCOUNTER FOR: ICD-10-CM

## 2023-06-05 ENCOUNTER — HOSPITAL ENCOUNTER (OUTPATIENT)
Dept: RADIOLOGY | Facility: MEDICAL CENTER | Age: 68
Discharge: HOME | End: 2023-06-05
Payer: COMMERCIAL

## 2023-06-05 DIAGNOSIS — R91.1 SOLITARY PULMONARY NODULE: ICD-10-CM

## 2023-06-05 PROCEDURE — 71250 CT THORAX DX C-: CPT

## 2023-06-05 PROCEDURE — G1004 CDSM NDSC: HCPCS

## 2023-07-18 NOTE — PROGRESS NOTES
Pulmonary Consultation   Troy Canales 80 y.o. female MRN: [id number]  7/18/2023      Reason for Consultation:  Pulmonary nodules    Requested by:  No ref. provider found     Primary care provider: Lynette Barkley MD    Assessment:  Abnormal CT scan  Given that patient is without symptoms and nonspecific findings on CAT scan. No further work-up or imaging is needed. She is of low risk and Lori Naegeli guidelines for lung nodules recommend repeat CT chest in 18 months. She is due for repeat CT chest for aneurysm monitoring in June of next year, and that is sufficient enough for nodule monitoring. Plan:    Diagnoses and all orders for this visit:    Abnormal CT scan of lung        History of Present Illness   HPI:  Troy Canales is a 80 y.o. female with past medical history of hypertension who presents due to abnormal CT. patient has been getting CT chest surveillance  due to aneurysm of ascending aorta. On imaging she was noted to have mucous plugging along with scattered nodules. She does not have any respiratory complaints. She states she was given antibiotics for bronchitis due to mucus impaction seen in December 2021, which she states she feels she then developed bronchitis thereafter. Otherwise denies SOB, cough, night sweats, unexplained weight loss. She denies any occupational risk factors for malignancy. She denies any family history of malignancy. Review of Systems   Constitutional: Negative for activity change, chills, diaphoresis, fatigue and fever. HENT: Negative for congestion, postnasal drip, rhinorrhea, sinus pressure, sneezing, sore throat and trouble swallowing. Eyes: Negative. Respiratory: Negative for cough, chest tightness and shortness of breath. Cardiovascular: Negative for chest pain, palpitations and leg swelling. Gastrointestinal: Negative for constipation, diarrhea, nausea and vomiting. Endocrine: Negative. Genitourinary: Negative.     Musculoskeletal: Negative for back pain, joint swelling and neck pain. Skin: Negative. Allergic/Immunologic: Negative. Neurological: Negative for dizziness, syncope, weakness, light-headedness and headaches. Hematological: Negative. Psychiatric/Behavioral: Negative. All other systems reviewed and are negative. Historical Information   Past Medical History:   Diagnosis Date   • Colon polyp 2012   • Hypertension 2021   • Lactose intolerance      Past Surgical History:   Procedure Laterality Date   • APPENDECTOMY     • CERVICAL POLYPECTOMY     • COLONOSCOPY  2012   • TUBAL LIGATION       Family History   Problem Relation Age of Onset   • Cancer Mother         Skin   • Hypertension Father    • Cancer Father         skin   • No Known Problems Daughter    • No Known Problems Son    • No Known Problems Son    • No Known Problems Maternal Grandmother    • No Known Problems Maternal Grandfather    • No Known Problems Paternal Grandmother    • No Known Problems Paternal Grandfather    • Cancer Maternal Aunt 80       Occupational History: dispatch  bus service     Social History:   Alcohol: none   Smoking: lifelong nonsmoker  Illicit drugs: none   Home heating system: oil  Pets:cats, dogs, bird cockatoo (nov 2005) and parrot (jan 2004)  Hobbies: donte  Exposures: birds (amio, nitrofurantoin, cyclophos, asbestos, beryllium, glass cutting, mining, sandblasting, farming, woodwork, Birds, down bedding, hot tubs)    Preventative:   Influenza vaccine: Sept 2022  Pneumonia vaccine: never     Meds/Allergies     Current Outpatient Medications:   •  aspirin 81 MG tablet, Take by mouth, Disp: , Rfl:   •  CALCIUM CITRATE PO, Take by mouth, Disp: , Rfl:   •  furosemide (LASIX) 20 mg tablet, , Disp: , Rfl:   •  Garlic 7107 MG CAPS, Take by mouth, Disp: , Rfl:   •  hydrochlorothiazide (HYDRODIURIL) 25 mg tablet, , Disp: , Rfl:   •  metoprolol succinate (TOPROL-XL) 25 mg 24 hr tablet, , Disp: , Rfl:   •  metoprolol succinate (TOPROL-XL) 50 mg 24 hr tablet, , Disp: , Rfl:   •  Misc Natural Products (GLUCOS-CHONDROIT-MSM COMPLEX PO), Take by mouth, Disp: , Rfl:   •  naproxen (NAPROSYN) 500 mg tablet, , Disp: , Rfl:   •  Omega-3 Fatty Acids (OMEGA 3 PO), Take by mouth, Disp: , Rfl:   •  omeprazole (PriLOSEC) 20 mg delayed release capsule, Take by mouth, Disp: , Rfl:   •  triamcinolone (KENALOG) 0.1 % cream, , Disp: , Rfl:   Allergies   Allergen Reactions   • Lactose - Food Allergy        Vitals: Blood pressure 128/72, pulse 60, temperature (!) 97.4 °F (36.3 °C), temperature source Tympanic, weight 112 kg (248 lb), SpO2 98 %. , Body mass index is 43.93 kg/m². Physical Exam  Physical Exam  Vitals and nursing note reviewed. Constitutional:       Appearance: Normal appearance. She is obese. HENT:      Head: Normocephalic and atraumatic. Right Ear: External ear normal.      Left Ear: External ear normal.      Nose: Nose normal.      Mouth/Throat:      Mouth: Mucous membranes are moist.      Pharynx: Oropharynx is clear. Eyes:      Conjunctiva/sclera: Conjunctivae normal.   Cardiovascular:      Rate and Rhythm: Normal rate and regular rhythm. Pulses: Normal pulses. Heart sounds: Normal heart sounds. Pulmonary:      Effort: Pulmonary effort is normal.      Breath sounds: Normal breath sounds. Abdominal:      General: Abdomen is flat. Bowel sounds are normal.      Palpations: Abdomen is soft. Musculoskeletal:         General: No swelling or tenderness. Cervical back: Neck supple. No muscular tenderness. Skin:     General: Skin is warm and dry. Capillary Refill: Capillary refill takes less than 2 seconds. Neurological:      Mental Status: She is alert and oriented to person, place, and time. Mental status is at baseline. Psychiatric:         Mood and Affect: Mood normal.         Behavior: Behavior normal.         Thought Content: Thought content normal.         Judgment: Judgment normal.         Labs:  I have personally reviewed pertinent lab results. No results found for: "WBC", "HGB", "HCT", "MCV", "PLT"  No results found for: "GLUCOSE", "CALCIUM", "NA", "K", "CO2", "CL", "BUN", "CREATININE"  No results found for: "IGE"  No results found for: "ALT", "AST", "GGT", "ALKPHOS", "BILITOT"    Imaging and other studies: I have personally reviewed pertinent reports. and I have personally reviewed pertinent films in PACS  CT chest 6/5/23:Again seen are few areas of peribronchial nodular density, compatible with chronic mucoid impaction. Nodules as follows (series 4): 5 mm nodule in the inferior right upper lobe on image #62, unchanged. 6 mm left lung base nodule image #70, unchanged. 4 mm left lung base nodule image #76, unchanged. A cluster of tree-in-bud densities in the left lower lobe on image #65, unchanged. CT chest 12/19/22:  There is mild groundglass opacity in the medial left lower lobe and lingula, stable since prior CT examination. Pulmonary nodules are seen on series 3 and compared with 12/13/2021 as described below. Again seen are branching nodular opacities in the left lower lobe (images 65 image 67) measuring 2.4 cm in greatest dimension (series 601 image 105). The largest individual nodular opacity measures tup to 0.8 cm (image 66), unchanged in size since   12/13/2021. Again seen is another cluster of branching nodular opacities more inferiorly on images 69-77, the largest nodular opacity measuring 0.7 cm on image 70, unchanged in size since 12/13/2021. A new 2 mm pulmonary nodule in the posterior right upper lobe with possible internal calcification (image 43). A linear 2 mm right lower lobe pulmonary nodule (image 65). A 2 mm right lower lobe pulmonary nodule (image 94). A 2 mm groundglass right middle lobe pulmonary nodule (image 67. A 2 mm right middle lobe pulmonary nodule (image 58). A 3 mm stable thai fissural nodule in the right middle lobe (image 54).  A possible stable 3 mm linear right upper lobe pulmonary nodule (image 25). Pulmonary function testing: n/a    EKG, Pathology, and Other Studies: I have personally reviewed pertinent reports. Echo 2/23/21: EF 55-60% with grade ! Diastolic dysfunction     [place] MD Adeline  Pulmonary/Critical Care Fellow PGY-V  [facility]      Disclaimer: Portions of the record may have been created with voice recognition software. Occasional wrong word or "sound a like" substitutions may have occurred due to the inherent limitations of voice recognition software. Careful consideration should be taken to recognize, using context, where substitutions have occurred.

## 2023-07-19 ENCOUNTER — CONSULT (OUTPATIENT)
Dept: PULMONOLOGY | Facility: CLINIC | Age: 68
End: 2023-07-19
Payer: COMMERCIAL

## 2023-07-19 VITALS
DIASTOLIC BLOOD PRESSURE: 72 MMHG | HEART RATE: 60 BPM | TEMPERATURE: 97.4 F | SYSTOLIC BLOOD PRESSURE: 128 MMHG | BODY MASS INDEX: 43.93 KG/M2 | WEIGHT: 248 LBS | OXYGEN SATURATION: 98 %

## 2023-07-19 DIAGNOSIS — R91.8 ABNORMAL CT SCAN OF LUNG: Primary | ICD-10-CM

## 2023-07-19 PROCEDURE — 99204 OFFICE O/P NEW MOD 45 MIN: CPT | Performed by: INTERNAL MEDICINE

## 2023-10-09 ENCOUNTER — ANNUAL EXAM (OUTPATIENT)
Dept: OBGYN CLINIC | Facility: CLINIC | Age: 68
End: 2023-10-09
Payer: COMMERCIAL

## 2023-10-09 VITALS
SYSTOLIC BLOOD PRESSURE: 124 MMHG | HEIGHT: 63 IN | DIASTOLIC BLOOD PRESSURE: 84 MMHG | WEIGHT: 256.2 LBS | BODY MASS INDEX: 45.39 KG/M2

## 2023-10-09 DIAGNOSIS — N81.6 RECTOCELE: ICD-10-CM

## 2023-10-09 DIAGNOSIS — Z12.31 SCREENING MAMMOGRAM, ENCOUNTER FOR: ICD-10-CM

## 2023-10-09 DIAGNOSIS — Z01.419 ENCOUNTER FOR ANNUAL ROUTINE GYNECOLOGICAL EXAMINATION: Primary | ICD-10-CM

## 2023-10-09 DIAGNOSIS — N39.41 URGE INCONTINENCE: ICD-10-CM

## 2023-10-09 DIAGNOSIS — R39.15 URGENCY OF URINATION: ICD-10-CM

## 2023-10-09 DIAGNOSIS — N84.1 CERVICAL POLYP: ICD-10-CM

## 2023-10-09 PROCEDURE — G0101 CA SCREEN;PELVIC/BREAST EXAM: HCPCS | Performed by: OBSTETRICS & GYNECOLOGY

## 2023-10-09 RX ORDER — METHYLPREDNISOLONE 4 MG/1
TABLET ORAL
COMMUNITY
Start: 2023-10-04

## 2023-10-09 RX ORDER — ALBUTEROL SULFATE 90 UG/1
AEROSOL, METERED RESPIRATORY (INHALATION)
COMMUNITY
Start: 2023-10-04

## 2023-10-09 NOTE — PROGRESS NOTES
Assessment/Plan: AbNGE  Small cervical polyp '20 - stable  Small rectocele '21  - stable  Obesity- recommended portion control, she eats to stay awake while driving. Gained 31 since  '18   BMD- NL 1/22 , repeat 1/28 [facility] 0.4/12 %]  Urgency with urge incontinence - stable  CoTesting '20- none further  Return the office in 1 year  3-D mammography annually  Self breast exams monthly  Calcium 1,000 mg/daily- she supplements   Exercise- very active but doesn't specifically exercise- work and walking dogs. Colonoscopy 11/12- 1 polyp, repeated 10/21 - 10 yrs now          Diagnoses and all orders for this visit:    Encounter for annual routine gynecological examination    Screening mammogram, encounter for  -     Mammo screening bilateral w 3d & cad; Future    Rectocele    Cervical polyp    Urgency of urination    Urge incontinence    Other orders  -     albuterol (PROVENTIL HFA,VENTOLIN HFA) 90 mcg/act inhaler  -     methylPREDNISolone 4 MG tablet therapy pack              Subjective:        Patient ID: Anastasiya Ralph is a 80 y.o. female. Dotty presents today for a yearly evaluation. She has no complaints. She denies any vaginal bleeding. She has persistent urgency and urge incontinence which is stable. She notes urinary frequency in the morning following the use of her diuretic. She is being followed by pulmonary for a solitary lung nodule. For the past year or so she has had a cough due to excess mucus. Her evaluation has been negative. She was recently given an inhaler. The following portions of the patient's history were reviewed and updated as appropriate: She  has a past medical history of Colon polyp (2012), Hypertension (2021), Lactose intolerance, and Varicella (1995).   Patient Active Problem List    Diagnosis Date Noted   • Abnormal CT scan of lung 07/19/2023   • Rectocele 09/18/2022   • Urge incontinence 09/18/2022   • Screening mammogram, encounter for 09/18/2022   • Obesity (BMI 35.0-39.9 without comorbidity) 09/18/2022   • Cervical polyp 09/11/2021   • Hypertension 2021   • Urgency of urination 08/13/2018   • Encounter for annual routine gynecological examination 08/12/2018   • Encounter for screening colonoscopy 08/12/2018   • Colon polyp 2012   PMH:  G5, P3; SAVD x3, VIP, Comp Ab  LTL '92  Appendectomy '80  GERD   Menopause '99 44  AUB - EMB 12/06  Cervical Polypectomy '12      Lactose Intolerant '13      Morbid obesity '19      HTN '21      Eczema 6/22  She  has a past surgical history that includes Tubal ligation; Appendectomy; Cervical polypectomy; and Colonoscopy (2012). Her family history includes Cancer in her father and mother; Cancer (age of onset: 80) in her maternal aunt; Hypertension in her father; No Known Problems in her daughter, maternal grandfather, maternal grandmother, paternal grandfather, paternal grandmother, son, and son. FH:  F- Bradycardia 67, MI 80's, d Pneumonia 80, Face Melanoma 80's  M- Alzheimer's d 80, Skin Ca 80  MA - MI      MU- DM   She  reports that she has never smoked. She has never used smokeless tobacco. She reports that she does not drink alcohol and does not use drugs. SH:   for [employer] , not in a relationship. 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 [employer] graduated [employer] '16. Wedding/Engagement was broken off by jenna. Aman ,28, [me] has 2  daughters and lives in [facility]).   Current Outpatient Medications   Medication Sig Dispense Refill   • albuterol (PROVENTIL HFA,VENTOLIN HFA) 90 mcg/act inhaler      • aspirin 81 MG tablet Take by mouth     • CALCIUM CITRATE PO Take by mouth     • furosemide (LASIX) 20 mg tablet      • Garlic 7809 MG CAPS Take by mouth     • methylPREDNISolone 4 MG tablet therapy pack      • metoprolol succinate (TOPROL-XL) 25 mg 24 hr tablet Take 75 mg by mouth 2 (two) times a day     • Misc Natural Products (GLUCOS-CHONDROIT-MSM COMPLEX PO) Take by mouth     • naproxen (NAPROSYN) 500 mg tablet      • Omega-3 Fatty Acids (OMEGA 3 PO) Take by mouth     • omeprazole (PriLOSEC) 20 mg delayed release capsule Take by mouth     • triamcinolone (KENALOG) 0.1 % cream        No current facility-administered medications for this visit. Current Outpatient Medications on File Prior to Visit   Medication Sig   • albuterol (PROVENTIL HFA,VENTOLIN HFA) 90 mcg/act inhaler    • aspirin 81 MG tablet Take by mouth   • CALCIUM CITRATE PO Take by mouth   • furosemide (LASIX) 20 mg tablet    • Garlic 2837 MG CAPS Take by mouth   • methylPREDNISolone 4 MG tablet therapy pack    • metoprolol succinate (TOPROL-XL) 25 mg 24 hr tablet Take 75 mg by mouth 2 (two) times a day   • Misc Natural Products (GLUCOS-CHONDROIT-MSM COMPLEX PO) Take by mouth   • naproxen (NAPROSYN) 500 mg tablet    • Omega-3 Fatty Acids (OMEGA 3 PO) Take by mouth   • omeprazole (PriLOSEC) 20 mg delayed release capsule Take by mouth   • triamcinolone (KENALOG) 0.1 % cream      No current facility-administered medications on file prior to visit. She is allergic to lactose - food allergy. .    Review of Systems   Constitutional: Negative for activity change, appetite change, fatigue and unexpected weight change. HENT: Positive for postnasal drip. Eyes: Negative for visual disturbance. Respiratory: Negative for cough, chest tightness, shortness of breath and wheezing. Cardiovascular: Negative for chest pain, palpitations and leg swelling. Breast: Patient denies tenderness, nipple discharge, masses, or erythema. Gastrointestinal: Negative for abdominal distention, abdominal pain, blood in stool, constipation, diarrhea, nausea and vomiting. Endocrine: Negative for cold intolerance and heat intolerance. Genitourinary: Positive for urgency. Negative for decreased urine volume, difficulty urinating, dysuria, frequency, hematuria, menstrual problem, pelvic pain, vaginal bleeding, vaginal discharge and vaginal pain.         Stable urgency and urge incontinence. Musculoskeletal: Negative for arthralgias. Skin: Negative for rash. Neurological: Negative for weakness, light-headedness, numbness and headaches. Hematological: Does not bruise/bleed easily. Psychiatric/Behavioral: Negative for agitation, behavioral problems and sleep disturbance. The patient is not nervous/anxious. Objective:    Vitals:    10/09/23 1522   BP: 124/84   BP Location: Left arm   Patient Position: Sitting   Cuff Size: Large   Weight: 116 kg (256 lb 3.2 oz)   Height: 5' 3.25" (1.607 m)            Physical Exam  Vitals and nursing note reviewed. Constitutional:       General: She is not in acute distress. Appearance: She is well-developed. HENT:      Head: Normocephalic and atraumatic. Eyes:      General: No scleral icterus. Right eye: No discharge. Left eye: No discharge. Extraocular Movements: Extraocular movements intact. Conjunctiva/sclera: Conjunctivae normal.   Neck:      Thyroid: No thyromegaly. Trachea: No tracheal deviation. Cardiovascular:      Rate and Rhythm: Normal rate and regular rhythm. Heart sounds: Normal heart sounds. No murmur heard. Pulmonary:      Effort: Pulmonary effort is normal. No respiratory distress. Breath sounds: Normal breath sounds. No wheezing. Chest:   Breasts:     Breasts are symmetrical.      Right: No inverted nipple, mass, nipple discharge, skin change or tenderness. Left: No inverted nipple, mass, nipple discharge, skin change or tenderness. Abdominal:      General: Bowel sounds are normal. There is no distension. Palpations: Abdomen is soft. There is no mass. Tenderness: There is no abdominal tenderness. There is no guarding or rebound. Genitourinary:     General: Normal vulva. Labia:         Right: No rash, tenderness or lesion. Left: No rash, tenderness or lesion. Vagina: Normal.      Cervix: No cervical motion tenderness or discharge. Uterus: Not deviated, not enlarged and not tender. Adnexa:         Right: No mass, tenderness or fullness. Left: No mass, tenderness or fullness. Rectum: No external hemorrhoid. Comments: Urethral meatus within normal limits. Perineum within normal limits. Bladder well supported. There is a stable rectocele. A small cervical polyp is present within the ectocervical os. It too is stable  Musculoskeletal:         General: No tenderness. Normal range of motion. Cervical back: Normal range of motion and neck supple. Lymphadenopathy:      Cervical: No cervical adenopathy. Skin:     General: Skin is warm and dry. Neurological:      Mental Status: She is alert and oriented to person, place, and time. Psychiatric:         Mood and Affect: Mood normal.         Behavior: Behavior normal.         Thought Content:  Thought content normal.         Judgment: Judgment normal.

## 2024-02-01 ENCOUNTER — HOSPITAL ENCOUNTER (OUTPATIENT)
Dept: RADIOLOGY | Facility: MEDICAL CENTER | Age: 69
Discharge: HOME | End: 2024-02-01
Payer: COMMERCIAL

## 2024-02-01 DIAGNOSIS — M79.604 PAIN IN RIGHT LEG: ICD-10-CM

## 2024-02-01 PROCEDURE — 93971 EXTREMITY STUDY: CPT | Performed by: SURGERY

## 2024-02-01 PROCEDURE — 93971 EXTREMITY STUDY: CPT

## 2024-02-09 ENCOUNTER — APPOINTMENT (OUTPATIENT)
Dept: RADIOLOGY | Facility: MEDICAL CENTER | Age: 69
End: 2024-02-09
Payer: COMMERCIAL

## 2024-02-09 DIAGNOSIS — M25.561 RIGHT KNEE PAIN, UNSPECIFIED CHRONICITY: ICD-10-CM

## 2024-02-09 DIAGNOSIS — M25.551 PAIN OF RIGHT HIP: ICD-10-CM

## 2024-02-09 PROCEDURE — 73562 X-RAY EXAM OF KNEE 3: CPT

## 2024-02-09 PROCEDURE — 73502 X-RAY EXAM HIP UNI 2-3 VIEWS: CPT

## 2024-02-19 ENCOUNTER — HOSPITAL ENCOUNTER (OUTPATIENT)
Dept: RADIOLOGY | Facility: MEDICAL CENTER | Age: 69
Discharge: HOME | End: 2024-02-19
Payer: COMMERCIAL

## 2024-02-19 VITALS — BODY MASS INDEX: 44.3 KG/M2 | HEIGHT: 63 IN | WEIGHT: 250 LBS

## 2024-02-19 DIAGNOSIS — Z12.31 ENCOUNTER FOR SCREENING MAMMOGRAM FOR MALIGNANT NEOPLASM OF BREAST: ICD-10-CM

## 2024-02-19 DIAGNOSIS — Z12.31 SCREENING MAMMOGRAM, ENCOUNTER FOR: ICD-10-CM

## 2024-02-19 PROCEDURE — 77067 SCR MAMMO BI INCL CAD: CPT

## 2024-02-19 PROCEDURE — 77063 BREAST TOMOSYNTHESIS BI: CPT

## 2024-02-21 PROBLEM — Z01.419 ENCOUNTER FOR ANNUAL ROUTINE GYNECOLOGICAL EXAMINATION: Status: RESOLVED | Noted: 2018-08-12 | Resolved: 2024-02-21

## 2024-06-11 ENCOUNTER — HOSPITAL ENCOUNTER (OUTPATIENT)
Dept: RADIOLOGY | Facility: MEDICAL CENTER | Age: 69
Discharge: HOME | End: 2024-06-11
Payer: COMMERCIAL

## 2024-06-11 DIAGNOSIS — R91.1 SOLITARY PULMONARY NODULE: ICD-10-CM

## 2024-06-11 PROCEDURE — 71250 CT THORAX DX C-: CPT

## 2024-07-24 ENCOUNTER — OFFICE VISIT (OUTPATIENT)
Dept: PULMONOLOGY | Facility: CLINIC | Age: 69
End: 2024-07-24
Payer: COMMERCIAL

## 2024-07-24 VITALS
BODY MASS INDEX: 42.68 KG/M2 | HEART RATE: 60 BPM | SYSTOLIC BLOOD PRESSURE: 132 MMHG | RESPIRATION RATE: 16 BRPM | WEIGHT: 250 LBS | HEIGHT: 64 IN | TEMPERATURE: 96 F | OXYGEN SATURATION: 95 % | DIASTOLIC BLOOD PRESSURE: 86 MMHG

## 2024-07-24 DIAGNOSIS — R93.89 ABNORMAL CT OF THE CHEST: Primary | ICD-10-CM

## 2024-07-24 DIAGNOSIS — J67.9 HYPERSENSITIVITY PNEUMONITIS (HCC): ICD-10-CM

## 2024-07-24 PROCEDURE — 99214 OFFICE O/P EST MOD 30 MIN: CPT | Performed by: INTERNAL MEDICINE

## 2024-07-24 RX ORDER — VALSARTAN 80 MG/1
TABLET ORAL
COMMUNITY

## 2024-07-24 NOTE — PROGRESS NOTES
Pulmonary Follow Up Note   Patience Nicholas 69 y.o. female MRN: [id number]  7/24/2024      Assessment/Plan:  Patience Nicholas is a 69 y.o. female with Hx of HTN and ascending aorta aneurysm who presents for a follow up appointment regarding abnormal CT chest.     Abnormal CT of the chest  Concern for hypersensitivity pneumonitis  Patient seen previously for GGO seen on CT, now with repeat imaging showing worsening GGO. Patient remains asymptomatic from a pulmonary standpoint. Given exposure to pet birds, there is a concern for possible HP. We discussed conservative approach with repeat CT chest and blood work vs bronchoscopy. She agreed to conservative approach at this time given she is without symptoms.  - Obtain extended hypersensitivity pneumonitis panel   - Repeat CT chest without contrast in 1 year   - Discussed importance of limiting contact, wearing mask while changing cages. Further discussed risk of continued exposure and worsening inflammation could lead to fibrosis over time.     Follow up in 1 year with repeat CT chest or sooner if symptoms develop. Will call to discuss results of above lab work.     History of Present Illness   HPI:  Patienec Nicholas is a 69 y.o. female with Hx of HTN and ascending aorta aneurysm who presents for a follow up appointment. Patient previously seen in consultation by Dr. Torre in 7/2023 for abnormal CT chest. She was found to have mucous plugging along with scattered nodules. She did not have respiratory complaints at that. She had follow up CT chest for her yearly surveillance that showed new areas of GGO. She reports having a cold/cough last fall/winter was treated with an antibiotic and prednisone and went away. Otherwise she has been asymptomatic from a pulmonary standpoint. She denied fevers, chills, SOB, cough, mucus production. She is a lifetime nonsmoker. She does have 2 birds at home that stay in the living room. She does feed and clean the bird cages. She has  had the birds for approximately 20 years.     Review of systems:  12 point review of systems was completed and was otherwise negative except as listed in HPI.    Historical Information   Past Medical History:   Diagnosis Date    Colon polyp 2012    Hypertension 2021    Lactose intolerance     Varicella 1995     Past Surgical History:   Procedure Laterality Date    APPENDECTOMY      CERVICAL POLYPECTOMY      COLONOSCOPY  2012    TUBAL LIGATION       Family History   Problem Relation Age of Onset    Cancer Mother         Skin    Hypertension Father     Cancer Father         skin    No Known Problems Daughter     No Known Problems Son     No Known Problems Son     No Known Problems Maternal Grandmother     No Known Problems Maternal Grandfather     No Known Problems Paternal Grandmother     No Known Problems Paternal Grandfather     Cancer Maternal Aunt 80     Social History     Tobacco Use   Smoking Status Never   Smokeless Tobacco Never   Tobacco Comments    None     Alcohol: none   Smoking: lifelong nonsmoker  Illicit drugs: none   Home heating system: oil  Pets:cats, dogs, bird cockatoo (nov 2005) and parrot (jan 2004)  Hobbies: donte  Exposures: birds     Meds/Allergies     Current Outpatient Medications:     albuterol (PROVENTIL HFA,VENTOLIN HFA) 90 mcg/act inhaler, , Disp: , Rfl:     aspirin 81 MG tablet, Take by mouth, Disp: , Rfl:     CALCIUM CITRATE PO, Take by mouth, Disp: , Rfl:     furosemide (LASIX) 20 mg tablet, , Disp: , Rfl:     Garlic 1000 MG CAPS, Take by mouth, Disp: , Rfl:     methylPREDNISolone 4 MG tablet therapy pack, , Disp: , Rfl:     metoprolol succinate (TOPROL-XL) 25 mg 24 hr tablet, Take 75 mg by mouth 2 (two) times a day, Disp: , Rfl:     Misc Natural Products (GLUCOS-CHONDROIT-MSM COMPLEX PO), Take by mouth, Disp: , Rfl:     naproxen (NAPROSYN) 500 mg tablet, , Disp: , Rfl:     Omega-3 Fatty Acids (OMEGA 3 PO), Take by mouth, Disp: , Rfl:     omeprazole (PriLOSEC) 20 mg delayed release  capsule, Take by mouth, Disp: , Rfl:     triamcinolone (KENALOG) 0.1 % cream, , Disp: , Rfl:   Allergies   Allergen Reactions    Lactose - Food Allergy        Vitals: There were no vitals taken for this visit. There is no height or weight on file to calculate BMI.        Physical Exam  General: No acute distress  HENT: Normocephalic, atraumatic.  PERRL, EOMI, Moist mucous membranes.  Neck: Supple  Lungs: Clear to auscultation bilaterally, no wheezes, rales, rhonchi.  On room air  Heart: Regular rate and rhythm, S1-S2 present, no murmurs rubs or gallops  Abdomen: Soft, nontender, nondistended, no organomegaly  Extremities: Warm and well perfused, no cyanosis, clubbing, or edema  Pulses: 2+ and symmetric  Skin: Warm, dry, no rashes or lesions  Neurologic: No focal neurologic deficits    Labs:  No current data     Imaging and other studies: I have personally reviewed pertinent reports.   and I have personally reviewed pertinent films in PACS  CT chest wo contrast    Result Date: 6/19/2024  Impression: 1. New/worsened areas of groundglass opacification in the left greater than right lungs, suggesting atypical infection. 2. Areas of bronchial mucus impaction in the left lower lobe and to a lesser extent right middle lobe are unchanged since 12/31/2021, considered benign. No suspicious nodules. 3. Ascending aortic ectasia measuring up to 4.1 cm, not significantly changed since 2021. Follow-up low-dose chest CT in 1 year is recommended. Workstation performed: [id number]     Pulmonary function testing: No prior data       EKG, Pathology, and Other Studies: I have personally reviewed pertinent reports.   and I have personally reviewed pertinent films in PACS  TTE 2/23/2021    Left Ventricle: Systolic function is normal with an ejection fraction   of 55-60%.     Left Ventricle: There is grade I (mild) diastolic dysfunction.     Aortic Valve: There is mild regurgitation.     Ascending Aorta: The ascending aorta is mildly  "dilated 3.7 cm.     Tricuspid Valve: There is mild regurgitation.     Disclaimer: Portions of the record may have been created with voice recognition software. Occasional wrong word or \"sound a like\" substitutions may have occurred due to the inherent limitations of voice recognition software. Careful consideration should be taken to recognize, using context, where substitutions have occurred.    Dre Matos DO   Pulmonary & Critical Care Medicine Fellow PGY-V    [facility]  Answers submitted by the patient for this visit:  Pulmonology Questionnaire (Submitted on 7/23/2024)  Chief Complaint: Primary symptoms  Chronicity: recurrent  Have you had a change in appetite?: No  Do you have chest pain?: No  Do you have shortness of breath that occurs with effort or exertion?: No  Do you have ear congestion?: No  Do you have ear pain?: No  Do you have a fever?: No  Do you have headaches?: No  Do you have heartburn?: No  Do you have fatigue?: No  Do you have muscle pain?: No  Do you have nasal congestion?: No  Do you have shortness of breath when lying flat?: No  Do you have shortness of breath when you wake up?: No  Do you have post-nasal drip?: No  Do you have a runny nose?: No  Do you have sneezing?: No  Do you have a sore throat?: No  Do you have sweats?: No  Do you have trouble swallowing?: No  Have you experienced weight loss?: No  Risk factors for lung disease: animal exposure    "

## 2024-07-25 ENCOUNTER — TELEPHONE (OUTPATIENT)
Age: 69
End: 2024-07-25

## 2024-07-25 NOTE — TELEPHONE ENCOUNTER
Pt calls in and states that she does not think the script for her blood work was given to her and was wondering if we can email it to her.    [email or web address]

## 2024-07-30 ENCOUNTER — TELEPHONE (OUTPATIENT)
Age: 69
End: 2024-07-30

## 2024-07-30 NOTE — TELEPHONE ENCOUNTER
Patient called in and would like to confirm if she needs to fast or non fast for her blood work .     Patient needs her lab script to be faxed over to [facility] on [address]   [facility] fax# [phone]    Please advise the patient

## 2024-07-31 NOTE — TELEPHONE ENCOUNTER
Lab script has been faxed to [facility] [phone]. I do not see anywhere that says fasting is required so there is no need to.

## 2024-08-06 ENCOUNTER — TELEPHONE (OUTPATIENT)
Age: 69
End: 2024-08-06

## 2024-08-13 ENCOUNTER — APPOINTMENT (OUTPATIENT)
Dept: LAB | Facility: MEDICAL CENTER | Age: 69
End: 2024-08-13
Payer: COMMERCIAL

## 2024-08-13 DIAGNOSIS — R93.89 ABNORMAL CT OF THE CHEST: ICD-10-CM

## 2024-08-13 DIAGNOSIS — J67.9 HYPERSENSITIVITY PNEUMONITIS (HCC): ICD-10-CM

## 2024-08-13 PROCEDURE — 86606 ASPERGILLUS ANTIBODY: CPT

## 2024-08-13 PROCEDURE — 86331 IMMUNODIFFUSION OUCHTERLONY: CPT

## 2024-08-13 PROCEDURE — 86671 FUNGUS NES ANTIBODY: CPT

## 2024-08-13 PROCEDURE — 36415 COLL VENOUS BLD VENIPUNCTURE: CPT

## 2024-08-13 PROCEDURE — 86602 ANTINOMYCES ANTIBODY: CPT

## 2024-08-15 DIAGNOSIS — Z00.6 ENCOUNTER FOR EXAMINATION FOR NORMAL COMPARISON OR CONTROL IN CLINICAL RESEARCH PROGRAM: ICD-10-CM

## 2024-08-19 LAB
ASPERGILLUS FUMAGATUS IGG: NEGATIVE
AUREOBASIDIUM PULLULANS IGG: NEGATIVE
LACEYELLA SACCHARI AB SER QL: NEGATIVE
PIGEON SERUM IGG: NEGATIVE
S RECTIVIRGULA AB SER QL ID: NEGATIVE
T VULGARIS AB SER QL ID: NEGATIVE

## 2024-10-20 NOTE — PROGRESS NOTES
Assessment/Plan:  Stable rectocele.  Calcium 3786-4112 mg (in divided doses-max 600 mg at one time) + 800-1000 IU Vit D daily unless otherwise directed. Avoid falls.   Exercise 150-300 minutes per week minimum including weight bearing exercises. DEXA scan-  ordered today    Call your insurance company to verify coverage prior to completing any ordered tests.    No further paps after age 65 as long as there has been adequate normal paps completed, no history of AMRITA 2  or a more severe pap diagnosis in the last 20 years.     Annual mammogram ordered and monthly breast self exam recommended .     Colonoscopy-  Due          Kegels 20 times twice daily.  Vaginal moisturizers twice weekly as needed.   Return to office in one year or sooner, if needed.        1. Rectocele  2. Urge incontinence  3. Encounter for screening mammogram for malignant neoplasm of breast  -     Mammo screening bilateral w 3d and cad; Future; Expected date: 2025  4. Encounter for osteoporosis screening in asymptomatic postmenopausal patient  -     DXA bone density spine hip and pelvis; Future; Expected date: 2024  5. BMI 40.0-44.9, adult ([facility])             Subjective:      Patient ID: Patience Nicholas is a 69 y.o. female.    HPI    Patience Nicholas is a 69 y.o.  (45 yo boy, 35 yo boy, 33 yo girl-Twining  ) female who is here today for her follow  up /problem visit. No gynecologic health concerns.   Has a small rectocele with no vaginal complaints. Normal daily BM.   Menopausal with no vaginal bleeding.   Exercise- not regularly but does walk her dogs 2-3 times per day.   Works FT at [employer].     Patience Nicholas is not sexually active  or in a relationship. Lives with her dogs.   She is not interested in STD screening today.   She denies vaginal discharge, itching or pelvic pain.   She has no urinary concerns, does have urge incontinence with a full bladder. Believes she needs to void more often to lessen this issue.   "No bowel concerns.  No breast concerns.     Last pap: no history of an abnormal pap  7/27/17 normal with negative HR HPV   8/31/2020 normal with negative HR HPV   Mammogram: 02/19/2024 normal   Colonoscopy: 10/25/2021 recall 10 years  DEXA scan: 01/31/2022 normal       Family history of cancer:   Cancer-related family history includes Cancer in her father and mother; Cancer (age of onset: 80) in her maternal aunt. There is no history of Breast cancer, Cervical cancer, Colon cancer, Endometrial cancer, Ovarian cancer, or Uterine cancer.      The following portions of the patient's history were reviewed and updated as appropriate: allergies, current medications, past family history, past medical history, past social history, past surgical history, and problem list.    Review of Systems   Constitutional: Negative.  Negative for activity change, appetite change, chills, diaphoresis, fatigue, fever and unexpected weight change.   HENT:  Negative for congestion, dental problem, sneezing, sore throat and trouble swallowing.    Eyes:  Negative for visual disturbance.   Respiratory:  Negative for chest tightness and shortness of breath.    Cardiovascular:  Negative for chest pain and leg swelling.   Gastrointestinal:  Negative for abdominal pain, constipation, diarrhea, nausea and vomiting.   Genitourinary:  Negative for difficulty urinating, dysuria, frequency, hematuria, pelvic pain, urgency, vaginal bleeding, vaginal discharge and vaginal pain.   Musculoskeletal:  Negative for back pain and neck pain.   Skin: Negative.    Allergic/Immunologic: Negative.    Neurological:  Negative for weakness and headaches.   Hematological:  Negative for adenopathy.   Psychiatric/Behavioral: Negative.           Objective:      /74 (BP Location: Left arm, Patient Position: Sitting, Cuff Size: Large)   Ht 5' 3\" (1.6 m)   Wt 112 kg (246 lb 9.6 oz)   LMP  (LMP Unknown)   BMI 43.68 kg/m²          Physical Exam  Vitals and nursing " note reviewed.   Constitutional:       Appearance: Normal appearance. She is well-developed.      Comments: 4BMI 43   HENT:      Head: Normocephalic.   Neck:      Thyroid: No thyromegaly.   Cardiovascular:      Rate and Rhythm: Normal rate and regular rhythm.      Heart sounds: Normal heart sounds.   Pulmonary:      Effort: Pulmonary effort is normal.      Breath sounds: Normal breath sounds.   Chest:   Breasts:     Breasts are symmetrical.      Right: Normal. No inverted nipple, mass, nipple discharge, skin change or tenderness.      Left: Normal. No inverted nipple, mass, nipple discharge, skin change or tenderness.   Abdominal:      Palpations: Abdomen is soft.   Genitourinary:     Exam position: Lithotomy position.      Labia:         Right: No rash, tenderness, lesion or injury.         Left: No rash, tenderness, lesion or injury.       Urethra: No prolapse, urethral pain, urethral swelling or urethral lesion.      Vagina: No signs of injury and foreign body. No vaginal discharge, erythema, tenderness, bleeding, lesions or prolapsed vaginal walls (rectocele).      Cervix: Normal.      Uterus: Normal.       Adnexa: Right adnexa normal and left adnexa normal.        Right: No mass, tenderness or fullness.          Left: No mass, tenderness or fullness.        Rectum: No external hemorrhoid.      Comments: Vulvovaginal atrophy  Physical exam limited by habitus  Musculoskeletal:         General: Normal range of motion.      Cervical back: Normal range of motion.   Lymphadenopathy:      Head:      Right side of head: No submental, submandibular, tonsillar or occipital adenopathy.      Left side of head: No submental, submandibular, tonsillar or occipital adenopathy.      Upper Body:      Right upper body: No supraclavicular or axillary adenopathy.      Left upper body: No supraclavicular or axillary adenopathy.      Lower Body: No right inguinal adenopathy. No left inguinal adenopathy.   Skin:     General: Skin is  warm and dry.   Neurological:      Mental Status: She is alert and oriented to person, place, and time.   Psychiatric:         Mood and Affect: Mood normal.         Behavior: Behavior normal. Behavior is cooperative.

## 2024-10-21 ENCOUNTER — ANNUAL EXAM (OUTPATIENT)
Dept: OBGYN CLINIC | Facility: CLINIC | Age: 69
End: 2024-10-21
Payer: COMMERCIAL

## 2024-10-21 VITALS
DIASTOLIC BLOOD PRESSURE: 74 MMHG | WEIGHT: 246.6 LBS | HEIGHT: 63 IN | BODY MASS INDEX: 43.7 KG/M2 | SYSTOLIC BLOOD PRESSURE: 122 MMHG

## 2024-10-21 DIAGNOSIS — Z12.31 ENCOUNTER FOR SCREENING MAMMOGRAM FOR MALIGNANT NEOPLASM OF BREAST: ICD-10-CM

## 2024-10-21 DIAGNOSIS — Z13.820 ENCOUNTER FOR OSTEOPOROSIS SCREENING IN ASYMPTOMATIC POSTMENOPAUSAL PATIENT: ICD-10-CM

## 2024-10-21 DIAGNOSIS — N81.6 RECTOCELE: Primary | ICD-10-CM

## 2024-10-21 DIAGNOSIS — Z78.0 ENCOUNTER FOR OSTEOPOROSIS SCREENING IN ASYMPTOMATIC POSTMENOPAUSAL PATIENT: ICD-10-CM

## 2024-10-21 DIAGNOSIS — N39.41 URGE INCONTINENCE: ICD-10-CM

## 2024-10-21 PROCEDURE — 99213 OFFICE O/P EST LOW 20 MIN: CPT | Performed by: NURSE PRACTITIONER

## 2024-10-21 NOTE — PATIENT INSTRUCTIONS
Stable rectocele.   Calcium 8389-6888 mg (in divided doses-max 600 mg at one time) + 800-1000 IU Vit D daily unless otherwise directed. Avoid falls.   Exercise 150-300 minutes per week minimum including weight bearing exercises. DEXA scan-  ordered today    Call your insurance company to verify coverage prior to completing any ordered tests.    No further paps after age 65 as long as there has been adequate normal paps completed, no history of AMRITA 2  or a more severe pap diagnosis in the last 20 years.     Annual mammogram ordered and monthly breast self exam recommended .     Colonoscopy-  Due 2031         Kegels 20 times twice daily.  Vaginal moisturizers twice weekly as needed.   Return to office in one year or sooner, if needed.

## 2024-11-26 ENCOUNTER — HOSPITAL ENCOUNTER (OUTPATIENT)
Dept: RADIOLOGY | Facility: MEDICAL CENTER | Age: 69
Discharge: HOME | End: 2024-11-26
Payer: COMMERCIAL

## 2024-11-26 DIAGNOSIS — Z13.820 ENCOUNTER FOR OSTEOPOROSIS SCREENING IN ASYMPTOMATIC POSTMENOPAUSAL PATIENT: ICD-10-CM

## 2024-11-26 DIAGNOSIS — Z78.0 ENCOUNTER FOR OSTEOPOROSIS SCREENING IN ASYMPTOMATIC POSTMENOPAUSAL PATIENT: ICD-10-CM

## 2024-11-26 PROCEDURE — 77080 DXA BONE DENSITY AXIAL: CPT

## 2024-11-27 ENCOUNTER — RESULTS FOLLOW-UP (OUTPATIENT)
Dept: OBGYN CLINIC | Facility: MEDICAL CENTER | Age: 69
End: 2024-11-27

## 2025-02-25 ENCOUNTER — HOSPITAL ENCOUNTER (OUTPATIENT)
Dept: RADIOLOGY | Facility: MEDICAL CENTER | Age: 70
Discharge: HOME | End: 2025-02-25
Payer: COMMERCIAL

## 2025-02-25 VITALS — BODY MASS INDEX: 43.68 KG/M2 | HEIGHT: 63 IN

## 2025-02-25 DIAGNOSIS — Z12.31 ENCOUNTER FOR SCREENING MAMMOGRAM FOR MALIGNANT NEOPLASM OF BREAST: ICD-10-CM

## 2025-02-25 PROCEDURE — 77063 BREAST TOMOSYNTHESIS BI: CPT

## 2025-02-25 PROCEDURE — 77067 SCR MAMMO BI INCL CAD: CPT

## 2025-07-09 ENCOUNTER — TELEPHONE (OUTPATIENT)
Dept: PULMONOLOGY | Facility: CLINIC | Age: 70
End: 2025-07-09

## 2025-07-09 NOTE — TELEPHONE ENCOUNTER
Spoke to pt regarding scheduling 1 year follow up with Dr. Matos. Pt stated at this time they would not like to schedule. Pt stated that they will call back.